# Patient Record
Sex: MALE | Race: WHITE | NOT HISPANIC OR LATINO | Employment: FULL TIME | URBAN - METROPOLITAN AREA
[De-identification: names, ages, dates, MRNs, and addresses within clinical notes are randomized per-mention and may not be internally consistent; named-entity substitution may affect disease eponyms.]

---

## 2017-01-04 ENCOUNTER — GENERIC CONVERSION - ENCOUNTER (OUTPATIENT)
Dept: OTHER | Facility: OTHER | Age: 32
End: 2017-01-04

## 2017-01-13 ENCOUNTER — GENERIC CONVERSION - ENCOUNTER (OUTPATIENT)
Dept: OTHER | Facility: OTHER | Age: 32
End: 2017-01-13

## 2017-01-27 ENCOUNTER — GENERIC CONVERSION - ENCOUNTER (OUTPATIENT)
Dept: OTHER | Facility: OTHER | Age: 32
End: 2017-01-27

## 2017-02-10 ENCOUNTER — GENERIC CONVERSION - ENCOUNTER (OUTPATIENT)
Dept: OTHER | Facility: OTHER | Age: 32
End: 2017-02-10

## 2017-03-28 ENCOUNTER — ALLSCRIPTS OFFICE VISIT (OUTPATIENT)
Dept: OTHER | Facility: OTHER | Age: 32
End: 2017-03-28

## 2017-03-29 ENCOUNTER — ALLSCRIPTS OFFICE VISIT (OUTPATIENT)
Dept: OTHER | Facility: OTHER | Age: 32
End: 2017-03-29

## 2017-04-05 ENCOUNTER — ALLSCRIPTS OFFICE VISIT (OUTPATIENT)
Dept: OTHER | Facility: OTHER | Age: 32
End: 2017-04-05

## 2018-01-11 NOTE — MISCELLANEOUS
Message  Return to work or school:   Babak Fairchild is under my professional care   He was seen in my office on 02/10/2016   He is able to return to work on  02/17/2016            Signatures   Electronically signed by : Huma Montoya, ; Feb 17 2016 12:43PM EST                       (Author)

## 2018-01-13 VITALS
WEIGHT: 214 LBS | DIASTOLIC BLOOD PRESSURE: 80 MMHG | BODY MASS INDEX: 28.99 KG/M2 | HEIGHT: 72 IN | SYSTOLIC BLOOD PRESSURE: 128 MMHG | TEMPERATURE: 98.2 F

## 2018-01-13 NOTE — MISCELLANEOUS
Message  Return to work or school:   Dneisha Alexander is under my professional care   He was seen in my office on 3/29/17   He is able to return to work on  3/30/17            Signatures   Electronically signed by : Gloria Britt MD; Mar 29 2017 11:42AM EST                       (Author)

## 2018-01-14 VITALS
DIASTOLIC BLOOD PRESSURE: 80 MMHG | HEART RATE: 96 BPM | SYSTOLIC BLOOD PRESSURE: 130 MMHG | WEIGHT: 215 LBS | BODY MASS INDEX: 29.12 KG/M2 | HEIGHT: 72 IN | RESPIRATION RATE: 16 BRPM | TEMPERATURE: 98.3 F

## 2018-01-16 NOTE — MISCELLANEOUS
Provider Comments  Provider Comments:   No Show  Established patient who had I&d of abscess in the office on 3/29/17  Patient was a NO SHOW today for his 1 week recheck of open wound        Signatures   Electronically signed by : Des Saleh, ; Apr 5 2017 11:26AM EST                       (Author)

## 2018-01-24 NOTE — CONSULTS
Assessment    1  Abdominal abscess (567 22) (K65 1)    Plan  PMH: History of abdominal abscess    · Sulfamethoxazole-Trimethoprim 800-160 MG Oral Tablet   Rx By: Desi Paz; Dispense: 0 Days ; #:14 Tablet; Refill: 0; For: PMH: History of abdominal abscess; CHRISTINA = N; Sent To: [id number]; Last Updated By: Lorie Peralta; 3/29/2017 11:59:41 AM    Discussion/Summary  Discussion Summary:   Yao Mckay is a 33year old male who presents today, per referral by Dr Veronica Araujo, for possible incision and drainage of an infected cyst  Physical exam showed a roughly 2 cm fluctuant structure under skin minimal cellulitis  Discussed risks, benefits, and alternatives of incision and drainage along with pre- and post- procedural protocol  Partial excision was performed but not tolerated well by the patient  Patient refused full formal excision at this time despite being told that there was a significant chance that the cyst would recur without full excision of the cyst wall  His wound was packed using 1/4 inch gauze today  Wound packing instructions were given with his step-father present  If he cannot tolerate wound packing, he was instructed to shower and begin applying silver gel with a gauze bandage daily  No cellulitis or active infection was observed thus he was recommended not to take his antibiotics to forgo unnecessary GI side effects  Explained that he can return to work as long as he feels comfortable  He was given a note excuse him from work today  He knows to contact our office if any problems or concerns arise  He will return for recheck next week  Enumerated the health benefits of smoking cessation  Patient expressed that he is not interested in quitting at this time  Chief Complaint  Chief Complaint Free Text Note Form: Skin "abscess"  New patient referred by his PCP, Veronica Araujo whom he saw yesterday  Was placed on Bactrim and scheduled with general surgery   Patient comes in today with raised, hard area to right pubic bone line  Very mild pink-tinged skin  No redness or active infection  No open wound or drainage  No fever or chills  Patient states he has had a lump to area for about 6 weeks  Just became painful yesterday  Patient extremely anxious about having skin opened and drained  Did not tolerate well  Small opening made in skin  Small amount of clear fluid and blood drained out  Patient refused to have cyst wall completely excised  Explained that "infection" may return  Packed with 1/4' plain packing  Patient instructed to also  some Silvergel ointment  To followup in one week  History of Present Illness  HPI: Marcus Cosby is a 33year old male who presents today, per referral by Dr Joann Morgan, for possible incision and drainage of an infected cyst  First noticed a lump at his waistline on right abdomen about a month and a half ago  He denies having pain until this past weekend when it became inflamed and painful  He states that he has never had a skin lesion in the past  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  He has not yet started taking antibiotics prescribed by his PCP  Patient is a current every day smoker  Review of Systems  Complete-Male:   Constitutional: No fever or chills, feels well, no tiredness, no recent weight gain or weight loss  Eyes: No complaints of eye pain, no red eyes, no discharge from eyes, no itchy eyes  ENT: no complaints of earache, no hearing loss, no nosebleeds, no nasal discharge, no sore throat, no hoarseness  Cardiovascular: No complaints of slow heart rate, no fast heart rate, no chest pain, no palpitations, no leg claudication, no lower extremity  Respiratory: No complaints of shortness of breath, no wheezing, no cough, no SOB on exertion, no orthopnea or PND     Gastrointestinal: No complaints of abdominal pain, no constipation, no nausea or vomiting, no diarrhea or bloody stools  Genitourinary: No complaints of dysuria, no incontinence, no hesitancy, no nocturia, no genital lesion, no testicular pain  Musculoskeletal: No complaints of arthralgia, no myalgias, no joint swelling or stiffness, no limb pain or swelling  Integumentary: skin lesion, but as noted in HPI  Neurological: No compliants of headache, no confusion, no convulsions, no numbness or tingling, no dizziness or fainting, no limb weakness, no difficulty walking  Psychiatric: Is not suicidal, no sleep disturbances, no anxiety or depression, no change in personality, no emotional problems  Endocrine: No complaints of proptosis, no hot flashes, no muscle weakness, no erectile dysfunction, no deepening of the voice, no feelings of weakness  Hematologic/Lymphatic: No complaints of swollen glands, no swollen glands in the neck, does not bleed easily, no easy bruising  ROS Reviewed:   ROS reviewed  Active Problems    1  Depression (311) (F32 9)   2  Low back pain (724 2) (M54 5)   3  Lumbar strain (847 2) (S39 012A)    Past Medical History    1  History of abdominal abscess (V13 89) (H87 247)  Active Problems And Past Medical History Reviewed: The active problems and past medical history were reviewed and updated today  Surgical History    1  History of Tonsillectomy  Surgical History Reviewed: The surgical history was reviewed and updated today  Family History  Mother    1  No pertinent family history  Family History Reviewed: The family history was reviewed and updated today  Social History    · Current every day smoker (305 1) (F17 200)   · Daily caffeine consumption, 1 serving a day   · Full-time employment   · Occasional alcohol use   · Tobacco use (305 1) (Z72 0)  Social History Reviewed: The social history was reviewed and updated today  The social history was reviewed and is unchanged  Current Meds   1   Sulfamethoxazole-Trimethoprim 800-160 MG Oral Tablet; TAKE 1 TABLET TWICE DAILY   FOR 7 DAYS; Therapy: [id number] to (Last Rx:28Mar2017)  Requested for: 28Mar2017 Ordered  Medication List Reviewed: The medication list was reviewed and updated today  Allergies    1  Penicillins    Vitals  Vital Signs    Recorded: 84TWY1049 11:03AM   Temperature 98 3 F   Heart Rate 96   Respiration 16   Systolic 919   Diastolic 80   Height 6 ft    Weight 215 lb    BMI Calculated 29 16   BSA Calculated 2 2     Physical Exam    Constitutional   General appearance: No acute distress, well appearing and well nourished  Eyes   Conjunctiva and lids: No swelling, erythema, or discharge  Pupils and irises: Equal, round and reactive to light  Sclera non-icteric  Ears, Nose, Mouth, and Throat   External inspection of ears and nose: Normal     Neck   Supple, symmetric, trachea midline, no masses   Pulmonary   Respiratory effort: No increased work of breathing or signs of respiratory distress  Auscultation of lungs: Clear to auscultation, equal breath sounds bilaterally, no wheezes, no rales, no rhonci  Cardiovascular   Auscultation of heart: Normal rate and rhythm, normal S1 and S2, without murmurs  Examination of extremities for edema and/or varicosities: Normal     Carotid pulses: Normal     Abdomen   Abdomen: Non-tender, no masses  Liver and spleen: No hepatomegaly or splenomegaly  Lymphatic   Palpation of lymph nodes in neck: No lymphadenopathy  Musculoskeletal   Digits and nails: Normal without clubbing or cyanosis  Extremities: No clubbing, no cyanosis, no edema   Skin   Skin and subcutaneous tissue: Normal without rashes or lesions  Examination of the skin for lesions: Abnormal   Purplish area right of midline at waist line  Roughly 2 cm fluctuant structure under skin minimal cellulitis  Neurologic   Sensation: Motor and sensory grossly intact      Psychiatric   Orientation to person, place and time: Normal     Mood and affect: Normal  Procedure    Procedure: incision and drainage of a(n) a cyst located on the (At waist-line right of midline) right abdomen  Risks, benefits, alternatives, infection risk, bleeding risk, allergic reaction risk and risk of excessive pain were discussed with the patient  Verbal consent was obtained prior to the procedure  Anesthesia: The area was anesthetized with of lidocaine 1% with epinephrine  The area was anesthetized with bupivacaine 0 25% without epinephrine  Procedure Note:   The area was incised with a #15 blade  A small brown and serosanguineous  (Murky) amount of fluid was drained  The specimen was place in buffered formalin and sent for pathology  Dressing: the cavity was loosely packed with a 1/4 inch wick and a sterile dressing was placed  Post-Procedure:   After the procedure, the patient complained of: excessive pain  Complications: there were no complications  Wound care instructions given to the patient include washing with soap and water, gently pat dry and applying silver gel  Wound care instructions were given to the patient  Follow-up in the office in 1 week(s)  A mixture of 1% Lidocaine with epinephrine and 0 25% bupivacaine without epinephrine was injected to anesthetize the purplish area at his waist line and right of his midline  An incision was made using a 15 blade scalpel  Partial removal of the cyst wall was performed however full excision was halted at the patient's request  Some murky fluid was expressed during incision and drainage  A gauze dressing was applied  The procedure was not tolerated but occurred without complication  Attending Note  Scribe Attestation:   Scribe Attestation Bruce BLAS am acting as a scribe in the presence of the attending physician while the attending physician examines the patient     Physician Attestation:   Jordana Deleon personally performed the services described in this documentation as scribed in my presence, and it is both accurate and complete        Future Appointments    Date/Time Provider Specialty Site   04/05/2017 11:00 AM Pat Reyna MD General Surgery [facility]     Signatures   Electronically signed by : Buck Kramer MD; Mar 31 2017  8:49AM EST                       (Author)

## 2020-08-13 ENCOUNTER — APPOINTMENT (EMERGENCY)
Dept: CT IMAGING | Facility: HOSPITAL | Age: 35
End: 2020-08-13

## 2020-08-13 ENCOUNTER — HOSPITAL ENCOUNTER (EMERGENCY)
Facility: HOSPITAL | Age: 35
Discharge: HOME | End: 2020-08-13
Attending: EMERGENCY MEDICINE

## 2020-08-13 VITALS
TEMPERATURE: 98 F | HEART RATE: 75 BPM | DIASTOLIC BLOOD PRESSURE: 66 MMHG | RESPIRATION RATE: 18 BRPM | SYSTOLIC BLOOD PRESSURE: 137 MMHG | OXYGEN SATURATION: 98 % | WEIGHT: 216.49 LBS | BODY MASS INDEX: 29.36 KG/M2

## 2020-08-13 DIAGNOSIS — R10.9 ABDOMINAL PAIN: Primary | ICD-10-CM

## 2020-08-13 DIAGNOSIS — N20.0 KIDNEY STONE: ICD-10-CM

## 2020-08-13 LAB
BACTERIA UR QL AUTO: ABNORMAL /HPF
BILIRUB UR QL STRIP: NEGATIVE
CLARITY UR: CLEAR
COLOR UR: YELLOW
COLOR, POC: YELLOW
GLUCOSE UR STRIP-MCNC: NEGATIVE MG/DL
HGB UR QL STRIP.AUTO: ABNORMAL
KETONES UR STRIP-MCNC: NEGATIVE MG/DL
LEUKOCYTE ESTERASE UR QL STRIP: ABNORMAL
MUCOUS THREADS UR QL AUTO: ABNORMAL
NITRITE UR QL STRIP: NEGATIVE
NON-SQ EPI CELLS URNS QL MICRO: ABNORMAL /HPF
PH UR STRIP.AUTO: 6 [PH] (ref 4.5–8)
PROT UR STRIP-MCNC: ABNORMAL MG/DL
RBC #/AREA URNS AUTO: ABNORMAL /HPF
SP GR UR STRIP.AUTO: >=1.03 (ref 1–1.03)
UROBILINOGEN UR QL STRIP.AUTO: 0.2 E.U./DL
WBC #/AREA URNS AUTO: ABNORMAL /HPF

## 2020-08-13 PROCEDURE — 87086 URINE CULTURE/COLONY COUNT: CPT

## 2020-08-13 PROCEDURE — G1004 CDSM NDSC: HCPCS

## 2020-08-13 PROCEDURE — 74176 CT ABD & PELVIS W/O CONTRAST: CPT

## 2020-08-13 PROCEDURE — 99282 EMERGENCY DEPT VISIT SF MDM: CPT | Performed by: EMERGENCY MEDICINE

## 2020-08-13 PROCEDURE — 81001 URINALYSIS AUTO W/SCOPE: CPT

## 2020-08-13 PROCEDURE — 99284 EMERGENCY DEPT VISIT MOD MDM: CPT

## 2020-08-13 NOTE — ED PROVIDER NOTES
History  Chief Complaint   Patient presents with    Abdominal Pain     Pt reports intense pain earlier today with episodes of vomiting  Pt states most of the pain has subsided but has sharp pain in LLQ  History provided by:  Patient   used: No    Abdominal Pain   Pain location:  L flank and LLQ  Pain quality: cramping    Pain radiates to:  Does not radiate  Pain severity:  Moderate  Onset quality:  Gradual  Duration:  6 hours  Timing:  Sporadic  Progression:  Improving  Chronicity:  New  Context: suspicious food intake    Context comment:  Sudden severe left flank pain in morning, now getting better  Relieved by:  Nothing  Worsened by:  Nothing  Ineffective treatments:  None tried  Associated symptoms: vomiting    Associated symptoms: no chest pain, no chills, no cough, no diarrhea, no dysuria, no fever, no hematochezia, no nausea, no shortness of breath and no sore throat    Vomiting:     Quality:  Stomach contents    Number of occurrences:  3    Severity:  Moderate    Duration:  1 hour    Timing:  Sporadic    Progression:  Resolved  Risk factors: has not had multiple surgeries        None       Past Medical History:   Diagnosis Date    No known health problems        Past Surgical History:   Procedure Laterality Date    TONSILLECTOMY         History reviewed  No pertinent family history  I have reviewed and agree with the history as documented  E-Cigarette/Vaping    E-Cigarette Use Never User      E-Cigarette/Vaping Substances     Social History     Tobacco Use    Smoking status: Current Every Day Smoker     Packs/day: 0 50    Smokeless tobacco: Never Used   Substance Use Topics    Alcohol use: Yes     Comment: social    Drug use: Yes     Types: Marijuana       Review of Systems   Constitutional: Negative for chills and fever  HENT: Negative for facial swelling, sore throat and trouble swallowing  Eyes: Negative for pain and visual disturbance     Respiratory: Negative for cough and shortness of breath  Cardiovascular: Negative for chest pain and leg swelling  Gastrointestinal: Positive for abdominal pain and vomiting  Negative for diarrhea, hematochezia and nausea  Genitourinary: Negative for dysuria and flank pain  Musculoskeletal: Negative for back pain, neck pain and neck stiffness  Skin: Negative for pallor and rash  Allergic/Immunologic: Negative for environmental allergies and immunocompromised state  Neurological: Negative for dizziness and headaches  Hematological: Negative for adenopathy  Does not bruise/bleed easily  Psychiatric/Behavioral: Negative for agitation and behavioral problems  All other systems reviewed and are negative  Physical Exam  Physical Exam  Vitals signs and nursing note reviewed  Constitutional:       General: He is not in acute distress  Appearance: He is well-developed  HENT:      Head: Normocephalic and atraumatic  Neck:      Musculoskeletal: Normal range of motion and neck supple  Cardiovascular:      Rate and Rhythm: Normal rate and regular rhythm  Heart sounds: Normal heart sounds  Pulmonary:      Effort: Pulmonary effort is normal       Breath sounds: Normal breath sounds  Abdominal:      General: Bowel sounds are normal       Palpations: Abdomen is soft  Tenderness: There is no abdominal tenderness  There is no guarding or rebound  Musculoskeletal: Normal range of motion  Skin:     General: Skin is warm and dry  Neurological:      Mental Status: He is alert and oriented to person, place, and time           Vital Signs  ED Triage Vitals [08/13/20 1423]   Temperature Pulse Respirations Blood Pressure SpO2   98 °F (36 7 °C) 75 18 137/66 98 %      Temp Source Heart Rate Source Patient Position - Orthostatic VS BP Location FiO2 (%)   Temporal Monitor Sitting Right arm --      Pain Score       5           Vitals:    08/13/20 1423   BP: 137/66   Pulse: 75   Patient Position - Orthostatic VS: Sitting         Visual Acuity      ED Medications  Medications - No data to display    Diagnostic Studies  Results Reviewed     Procedure Component Value Units Date/Time    Urine Microscopic [[id number]]  (Abnormal) Collected:  08/13/20 1536    Lab Status:  Final result Specimen:  Urine, Clean Catch Updated:  08/13/20 1558     RBC, UA 10-20 /hpf      WBC, UA Innumerable /hpf      Epithelial Cells Occasional /hpf      Bacteria, UA Innumerable /hpf      MUCUS THREADS Moderate    Urine culture [[id number]] Collected:  08/13/20 1536    Lab Status: In process Specimen:  Urine, Clean Catch Updated:  08/13/20 1557    POCT urinalysis dipstick [[id number]]  (Abnormal) Resulted:  08/13/20 1540    Lab Status:  Final result Specimen:  Urine Updated:  08/13/20 1540     Color, UA yellow    Urine Macroscopic, POC [[id number]]  (Abnormal) Collected:  08/13/20 1536    Lab Status:  Final result Specimen:  Urine Updated:  08/13/20 1538     Color, UA Yellow     Clarity, UA Clear     pH, UA 6 0     Leukocytes, UA Trace     Nitrite, UA Negative     Protein, UA Trace mg/dl      Glucose, UA Negative mg/dl      Ketones, UA Negative mg/dl      Urobilinogen, UA 0 2 E U /dl      Bilirubin, UA Negative     Blood, UA Trace     Specific Gravity, UA >=1 030    Narrative:       [facility] RESULT                 CT renal stone study abdomen pelvis without contrast   Final Result by Evette Manriquez MD (08/13 1508)      2 mm nonobstructing left-sided intrarenal calculus  No hydronephrosis  Workstation performed: [id number]                    Procedures  Procedures         ED Course  ED Course as of Aug 13 1639   Thu Aug 13, 2020   4539 Patient does not want IV, understands that if we do not put IV/check labs/get CT with contrast, the ER evaluation would not be optimal and potential significant pathology may be missed   Patient and significant other also discussed, patient is ok with CT w/o contrast, understands that if his symptoms get worse, he may need to come back and get further evaluation including repeat CT with contrast, informed and understands risk of radiation  1552 Leukocytes, UA(!): Trace   1552 POCT URINE PROTEIN(!): Trace   1552 Urine dip noted, Micro pending, patient wants to  leave  Blood, UA(!): Trace                                             [facility]  Number of Diagnoses or Management Options  Abdominal pain: new and requires workup  Kidney stone:   Diagnosis management comments: Impression: Left-sided mid anterior abdominal pain, started in morning, now better, no distress, VSS, Abd exam benign  Patient just wants to be evaluated  Adamantly declines IV, lab testing, ok with CT without contrast understanding that ER evaluation would be suboptimal and potential serious pathology may be missed  CT showed small intrarenal calculus, however possibility of a passed stone there as patient now feels better with acute pain resolved  Advised to ensure adequate oral hydration, increased water intake, follow-up with urology  Return to ED for worsening symptoms  Amount and/or Complexity of Data Reviewed  Clinical lab tests: reviewed and ordered  Tests in the radiology section of CPT®: ordered and reviewed  Independent visualization of images, tracings, or specimens: yes          Disposition  Final diagnoses:   Abdominal pain   Kidney stone     Time reflects when diagnosis was documented in both MDM as applicable and the Disposition within this note     Time User Action Codes Description Comment    8/13/2020  3:56 PM Jarome Cart Add [R10 9] Abdominal pain     8/13/2020  3:56 PM Jarome Cart Add [N20 0] Kidney stone       ED Disposition     ED Disposition Condition Date/Time Comment    Discharge Stable Thu Aug 13, 2020  3:56 PM Rosmery Mobley discharge to home/self care              Follow-up Information     Follow up With Specialties Details Why Contact Info Additional Information    Channing Saul DO Family Medicine Schedule an appointment as soon as possible for a visit   [id number]       [address] Emergency Department Emergency Medicine  If symptoms worsen [facility] [address] [phone] [place] ED, [address]  , [place], [place], [address] For Urology [facility] Urology Schedule an appointment as soon as possible for a visit   [address]  [address]  [address] [place] [address] For Urology [address], [place], [address] [phone]          There are no discharge medications for this patient  No discharge procedures on file      PDMP Review     None          ED Provider  Electronically Signed by           Cornelius Horn MD  08/13/20 3710

## 2020-08-13 NOTE — ED NOTES
Patient transported to [facility] [address], RN  08/13/20 6975
none

## 2020-08-15 LAB — BACTERIA UR CULT: NORMAL

## 2021-06-04 ENCOUNTER — HOSPITAL ENCOUNTER (EMERGENCY)
Facility: HOSPITAL | Age: 36
Discharge: HOME | End: 2021-06-04
Attending: EMERGENCY MEDICINE | Admitting: EMERGENCY MEDICINE

## 2021-06-04 VITALS
SYSTOLIC BLOOD PRESSURE: 138 MMHG | BODY MASS INDEX: 30.77 KG/M2 | DIASTOLIC BLOOD PRESSURE: 98 MMHG | TEMPERATURE: 98.5 F | HEART RATE: 90 BPM | OXYGEN SATURATION: 98 % | WEIGHT: 226.85 LBS | RESPIRATION RATE: 16 BRPM

## 2021-06-04 DIAGNOSIS — T78.40XA ACUTE ALLERGIC REACTION: Primary | ICD-10-CM

## 2021-06-04 DIAGNOSIS — L50.9 URTICARIA: ICD-10-CM

## 2021-06-04 DIAGNOSIS — M79.89 SWELLING OF BOTH HANDS: ICD-10-CM

## 2021-06-04 DIAGNOSIS — T78.3XXA ANGIOEDEMA: ICD-10-CM

## 2021-06-04 PROCEDURE — 99284 EMERGENCY DEPT VISIT MOD MDM: CPT | Performed by: EMERGENCY MEDICINE

## 2021-06-04 PROCEDURE — 99283 EMERGENCY DEPT VISIT LOW MDM: CPT

## 2021-06-04 RX ORDER — EPINEPHRINE 0.3 MG/.3ML
0.3 INJECTION SUBCUTANEOUS ONCE
Qty: 0.6 ML | Refills: 0 | Status: SHIPPED | OUTPATIENT
Start: 2021-06-04 | End: 2021-06-04

## 2021-06-04 RX ORDER — DIPHENHYDRAMINE HCL 25 MG
50 TABLET ORAL EVERY 8 HOURS PRN
Qty: 20 TABLET | Refills: 0 | Status: SHIPPED | OUTPATIENT
Start: 2021-06-04

## 2021-06-04 RX ORDER — DIPHENHYDRAMINE HCL 25 MG
50 TABLET ORAL ONCE
Status: COMPLETED | OUTPATIENT
Start: 2021-06-04 | End: 2021-06-04

## 2021-06-04 RX ORDER — PREDNISONE 20 MG/1
60 TABLET ORAL ONCE
Status: COMPLETED | OUTPATIENT
Start: 2021-06-04 | End: 2021-06-04

## 2021-06-04 RX ORDER — PREDNISONE 20 MG/1
40 TABLET ORAL DAILY
Qty: 10 TABLET | Refills: 0 | Status: SHIPPED | OUTPATIENT
Start: 2021-06-04 | End: 2021-06-09

## 2021-06-04 RX ORDER — DIAPER,BRIEF,INFANT-TODD,DISP
EACH MISCELLANEOUS ONCE
Status: COMPLETED | OUTPATIENT
Start: 2021-06-04 | End: 2021-06-04

## 2021-06-04 RX ORDER — DIAPER,BRIEF,INFANT-TODD,DISP
EACH MISCELLANEOUS
Qty: 15 G | Refills: 0 | Status: SHIPPED | OUTPATIENT
Start: 2021-06-04

## 2021-06-04 RX ADMIN — DIPHENHYDRAMINE HCL 50 MG: 25 TABLET ORAL at 05:32

## 2021-06-04 RX ADMIN — HYDROCORTISONE 1 APPLICATION: 1 CREAM TOPICAL at 06:02

## 2021-06-04 RX ADMIN — PREDNISONE 60 MG: 20 TABLET ORAL at 05:32

## 2021-06-04 NOTE — ED PROVIDER NOTES
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 to new material  Took benadryl yesterday with some relief  History provided by:  Patient   used: No    Allergic Reaction  Presenting symptoms: itching, rash (hives b/l UE) and swelling    Presenting symptoms: no wheezing    Itching:     Location:  Hand and arm    Severity:  Severe    Onset quality:  Gradual    Duration:  4 days    Timing:  Constant    Progression:  Worsening  Rash:     Location:  Arm    Severity:  Moderate    Onset quality:  Gradual    Duration:  4 days    Timing:  Constant  Severity:  Moderate  Duration:  4 days  Prior allergic episodes:  No prior episodes  Context comment:  New fabric at work  Relieved by: Antihistamines  Worsened by:  Nothing      None       Past Medical History:   Diagnosis Date    No known health problems        Past Surgical History:   Procedure Laterality Date    TONSILLECTOMY         History reviewed  No pertinent family history  I have reviewed and agree with the history as documented      E-Cigarette/Vaping    E-Cigarette Use Never User      E-Cigarette/Vaping Substances     Social History     Tobacco Use    Smoking status: Current Every Day Smoker     Packs/day: 0 50    Smokeless tobacco: Never Used   Substance Use Topics    Alcohol use: Yes     Comment: social    Drug use: Yes     Types: Marijuana       Review of Systems   Constitutional: Negative for chills and fever    HENT: Negative for congestion and sore throat  Eyes: Negative for visual disturbance  Respiratory: Negative for shortness of breath and wheezing  Cardiovascular: Negative for chest pain and palpitations  Gastrointestinal: Negative for abdominal pain, diarrhea, nausea and vomiting  Genitourinary: Negative for dysuria  Musculoskeletal: Negative for neck pain and neck stiffness  Skin: Positive for itching and rash (hives b/l UE)  Negative for pallor  Swollen hands b/l   Neurological: Negative for headaches  Psychiatric/Behavioral: Negative for confusion  All other systems reviewed and are negative  Physical Exam  Physical Exam  Vitals signs and nursing note reviewed  Constitutional:       General: He is not in acute distress  Appearance: He is well-developed  HENT:      Head: Normocephalic and atraumatic  Right Ear: External ear normal       Left Ear: External ear normal       Mouth/Throat:      Mouth: Mucous membranes are moist       Comments: No angioedema noted  Eyes:      Extraocular Movements: Extraocular movements intact  Neck:      Musculoskeletal: Neck supple  Cardiovascular:      Rate and Rhythm: Normal rate and regular rhythm  Heart sounds: No murmur  Pulmonary:      Effort: Pulmonary effort is normal  No respiratory distress  Breath sounds: Normal breath sounds  No wheezing  Musculoskeletal: Normal range of motion  General: Swelling (b/l hands, palmar erythema from excoriation of pt itching) present  Skin:     General: Skin is warm  Coloration: Skin is not pale  Findings: Rash (hives b/l UE) present  Neurological:      Mental Status: He is alert and oriented to person, place, and time     Psychiatric:         Behavior: Behavior normal          Vital Signs  ED Triage Vitals [06/04/21 0518]   Temperature Pulse Respirations Blood Pressure SpO2   98 5 °F (36 9 °C) 102 18 (!) 141/101 98 %      Temp Source Heart Rate Source Patient Position - Orthostatic VS BP Location FiO2 (%)   Oral Monitor Sitting Left arm --      Pain Score       --           Vitals:    06/04/21 0518   BP: (!) 141/101   Pulse: 102   Patient Position - Orthostatic VS: Sitting         Visual Acuity      ED Medications  Medications   hydrocortisone 1 % cream (has no administration in time range)   diphenhydrAMINE (BENADRYL) tablet 50 mg (50 mg Oral Given 6/4/21 0532)   predniSONE tablet 60 mg (60 mg Oral Given 6/4/21 0532)       Diagnostic Studies  Results Reviewed     None                 No orders to display              Procedures  Procedures         ED Course  ED Course as of Jun 04 0552 Fri Jun 04, 2021   0515 Pt seen and examined  30 yo male who presents with generalized itchy body rash and swelling in hands b/l  No facial/oral angioedema or airway involvement  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 to new material  Took benadryl yesterday with some relief  Will treat with benadryl and prednisone here and d/c home on same with epi pen  Pt aware of need to avoid same material he had reaction to  MDM    Disposition  Final diagnoses:   Acute allergic reaction   Urticaria   Swelling of both hands   Angioedema     Time reflects when diagnosis was documented in both MDM as applicable and the Disposition within this note     Time User Action Codes Description Comment    6/4/2021  5:47 AM Evette BROWN Add [T78 40XA] Acute allergic reaction     6/4/2021  5:47 AM Evette BROWN Add [L50 9] Urticaria     6/4/2021  5:47 AM Negra Jones Add [M79 89] Swelling of both hands     6/4/2021  5:51 AM Patricia [address]  74  3XXA] Angioedema       ED Disposition     ED Disposition Condition Date/Time Comment    Discharge Stable Fri Jun 4, 2021  5:47 AM Yvette Fair discharge to home/self care              Follow-up Information     Follow up With Specialties Details Why Contact Info    Whit Suarez DO Family Medicine Schedule an appointment as soon as possible for a visit  As needed Arun Reeves  [phone]            Patient's Medications   Discharge Prescriptions    DIPHENHYDRAMINE (BENADRYL) 25 MG TABLET    Take 2 tablets (50 mg total) by mouth every 8 (eight) hours as needed for itching       Start Date: 6/4/2021  End Date: --       Order Dose: 50 mg       Quantity: 20 tablet    Refills: 0    EPINEPHRINE (EPIPEN) 0 3 MG/0 3 ML SOAJ    Inject 0 3 mL (0 3 mg total) into a muscle once for 1 dose       Start Date: 6/4/2021  End Date: 6/4/2021       Order Dose: 0 3 mg       Quantity: 0 6 mL    Refills: 0    HYDROCORTISONE 1 % CREAM    Apply to affected area 3 times daily as needed       Start Date: 6/4/2021  End Date: --       Order Dose: --       Quantity: 15 g    Refills: 0    PREDNISONE 20 MG TABLET    Take 2 tablets (40 mg total) by mouth daily for 5 days       Start Date: 6/4/2021  End Date: 6/9/2021       Order Dose: 40 mg       Quantity: 10 tablet    Refills: 0     No discharge procedures on file      PDMP Review     None          ED Provider  Electronically Signed by           Sylvia Gómez DO  06/04/21 0459

## 2021-06-04 NOTE — Clinical Note
Jose Vergara was seen and treated in our emergency department on 6/4/2021  Please allow pt to avoid direct contact with fabric he had allergic reaction to    Diagnosis: Allergic reaction    Romulo Glass  may return to work on return date  He may return on this date: 06/07/2021         If you have any questions or concerns, please don't hesitate to call        Lida Izquierdo, DO    ______________________________           _______________          _______________  Hospital Representative                              Date                                Time

## 2021-06-06 ENCOUNTER — HOSPITAL ENCOUNTER (EMERGENCY)
Facility: HOSPITAL | Age: 36
Discharge: HOME | End: 2021-06-06
Attending: EMERGENCY MEDICINE | Admitting: EMERGENCY MEDICINE

## 2021-06-06 VITALS
TEMPERATURE: 98.7 F | BODY MASS INDEX: 29.84 KG/M2 | DIASTOLIC BLOOD PRESSURE: 98 MMHG | WEIGHT: 220 LBS | RESPIRATION RATE: 20 BRPM | OXYGEN SATURATION: 95 % | HEART RATE: 116 BPM | SYSTOLIC BLOOD PRESSURE: 149 MMHG

## 2021-06-06 DIAGNOSIS — T78.40XA ALLERGIC REACTION: Primary | ICD-10-CM

## 2021-06-06 PROCEDURE — 99283 EMERGENCY DEPT VISIT LOW MDM: CPT

## 2021-06-06 PROCEDURE — 99284 EMERGENCY DEPT VISIT MOD MDM: CPT | Performed by: EMERGENCY MEDICINE

## 2021-06-06 RX ORDER — HYDROXYZINE HYDROCHLORIDE 25 MG/1
25 TABLET, FILM COATED ORAL EVERY 6 HOURS PRN
Qty: 20 TABLET | Refills: 0 | Status: SHIPPED | OUTPATIENT
Start: 2021-06-06

## 2021-06-06 RX ORDER — EPINEPHRINE 0.3 MG/.3ML
0.3 INJECTION SUBCUTANEOUS ONCE
Qty: 0.6 ML | Refills: 0 | Status: SHIPPED | OUTPATIENT
Start: 2021-06-06 | End: 2021-06-06

## 2021-06-06 RX ORDER — PREDNISONE 20 MG/1
30 TABLET ORAL 2 TIMES DAILY
Qty: 12 TABLET | Refills: 0 | Status: SHIPPED | OUTPATIENT
Start: 2021-06-06 | End: 2021-06-10

## 2021-06-06 RX ORDER — FAMOTIDINE 20 MG/1
20 TABLET, FILM COATED ORAL 2 TIMES DAILY
Qty: 30 TABLET | Refills: 0 | Status: SHIPPED | OUTPATIENT
Start: 2021-06-06

## 2021-06-06 RX ORDER — HYDROXYZINE HYDROCHLORIDE 25 MG/1
25 TABLET, FILM COATED ORAL ONCE
Status: COMPLETED | OUTPATIENT
Start: 2021-06-06 | End: 2021-06-06

## 2021-06-06 RX ADMIN — PREDNISONE 30 MG: 10 TABLET ORAL at 14:24

## 2021-06-06 RX ADMIN — HYDROXYZINE HYDROCHLORIDE 25 MG: 25 TABLET ORAL at 14:24

## 2021-06-06 NOTE — ED ATTENDING ATTESTATION
6/6/2021  I, Sunita Solano MD, saw and evaluated the patient  I have discussed the patient with the resident/non-physician practitioner and agree with the resident's/non-physician practitioner's findings, Plan of Care, and MDM as documented in the resident's/non-physician practitioner's note, except where noted  All available labs and Radiology studies were reviewed  I was present for key portions of any procedure(s) performed by the resident/non-physician practitioner and I was immediately available to provide assistance  At this point I agree with the current assessment done in the Emergency Department  I have conducted an independent evaluation of this patient a history and physical is as follows:    Final Diagnosis:  1  Allergic reaction      Chief Complaint   Patient presents with    Rash     states expose to material at work on Friday that he was allergic to taking prednisone for it as once prednisone wears off pt with trouble breathing swelling and iching no reexpose to material since Friday       This is a 60-year-old male who presents with allergic reaction  Starting on Wednesday, patient started with facial swelling and itching  Believes that he is allergic to a material at his work  He was seen in the emergency department on Friday and started on Benadryl and prednisone  States that he takes the prednisone at night and feels much better  However, it only last for approximately 12 hours  He is also taking Benadryl intermittently which alleviates his symptoms  Denies any reexposure  Denies any difficulty swallowing, difficulty breathing, wheezing/stridor, tongue swelling/throat swelling, chest pain/shortness of breath, abdominal pain, nausea/vomiting, diarrhea    Denies fever/chills, nausea/vomiting, lightheadedness/dizziness, numbness/weakness, headache, change in vision, URI symptoms, neck pain, chest pain, palpitations, shortness of breath, cough, back pain, flank pain, abdominal pain, diarrhea, hematochezia, melena, dysuria, hematuria  PMH:  -seasonal allergies  PSH:  - not applicable      PE:   Vitals:    06/06/21 1350   BP: 149/98   BP Location: Right arm   Pulse: (!) 116   Resp: 20   Temp: 98 7 °F (37 1 °C)   TempSrc: Oral   SpO2: 95%   Weight: 99 8 kg (220 lb)       Constitutional: Vital signs are normal  He appears well-developed  He is cooperative  No distress  HENT:   Mouth/Throat: Uvula is midline, oropharynx is clear and moist and mucous membranes are normal   No or pharyngeal swelling  Eyes: Pupils are equal, round, and reactive to light  Mild periorbital swelling  Head:  Mild swelling throughout face with urticaria  Conjunctivae and EOM are normal    Neck: Trachea normal  No thyroid mass and no thyromegaly present  Cardiovascular:  Tachycardic, regular rhythm, normal heart sounds  Pulmonary/Chest: Effort normal and breath sounds normal    Abdominal: Soft  Normal appearance and bowel sounds are normal  There is no tenderness  There is no rebound, no guarding and no CVA tenderness  Neurological: He is alert  Skin: Skin is warm, dry and intact  Psychiatric: He has a normal mood and affect  His speech is normal and behavior is normal  Thought content normal          A:  - this is a 52-year-old male who presents with an allergic reaction  P:  - will change the prednisone to b i d  dosing  Continue Benadryl  Add Pepcid  Strict return precautions given  - 13 point ROS was performed and all are normal unless stated in the history above  - Nursing note reviewed  Vitals reviewed  - Orders placed by myself and/or advanced practitioner / resident     - Previous chart was reviewed  - No language barrier    - History obtained from patient  - There are no limitations to the history obtained  - Critical care time: Not applicable for this patient            Medications   hydrOXYzine HCL (ATARAX) tablet 25 mg (25 mg Oral Given 6/6/21 1424)   predniSONE tablet 30 mg (30 mg Oral Given 6/6/21 5854)     No orders to display     No orders of the defined types were placed in this encounter  Labs Reviewed - No data to display  Time reflects when diagnosis was documented in both MDM as applicable and the Disposition within this note     Time User Action Codes Description Comment    6/6/2021  2:21 PM [facility] [facility] [address] Allergic reaction       ED Disposition     ED Disposition Condition Date/Time Comment    Discharge Stable Sun Jun 6, 2021  2:21 PM Veronica Push discharge to home/self care  Follow-up Information    None       Patient's Medications   Discharge Prescriptions    EPINEPHRINE (EPIPEN) 0 3 MG/0 3 ML SOAJ    Inject 0 3 mL (0 3 mg total) into a muscle once for 1 dose       Start Date: 6/6/2021  End Date: 6/6/2021       Order Dose: 0 3 mg       Quantity: 0 6 mL    Refills: 0    FAMOTIDINE (PEPCID) 20 MG TABLET    Take 1 tablet (20 mg total) by mouth 2 (two) times a day       Start Date: 6/6/2021  End Date: --       Order Dose: 20 mg       Quantity: 30 tablet    Refills: 0    HYDROXYZINE HCL (ATARAX) 25 MG TABLET    Take 1 tablet (25 mg total) by mouth every 6 (six) hours as needed for itching for up to 20 doses       Start Date: 6/6/2021  End Date: --       Order Dose: 25 mg       Quantity: 20 tablet    Refills: 0    PREDNISONE 20 MG TABLET    Take 1 5 tablets (30 mg total) by mouth 2 (two) times a day for 4 days       Start Date: 6/6/2021  End Date: 6/10/2021       Order Dose: 30 mg       Quantity: 12 tablet    Refills: 0     No discharge procedures on file  Prior to Admission Medications   Prescriptions Last Dose Informant Patient Reported? Taking?    EPINEPHrine (EPIPEN) 0 3 mg/0 3 mL SOAJ   No No   Sig: Inject 0 3 mL (0 3 mg total) into a muscle once for 1 dose   diphenhydrAMINE (BENADRYL) 25 mg tablet   No Yes   Sig: Take 2 tablets (50 mg total) by mouth every 8 (eight) hours as needed for itching   hydrocortisone 1 % cream   No No   Sig: Apply to affected area 3 times daily as needed   predniSONE 20 mg tablet   No Yes   Sig: Take 2 tablets (40 mg total) by mouth daily for 5 days      Facility-Administered Medications: None       Portions of the record may have been created with voice recognition software  Occasional wrong word or "sound a like" substitutions may have occurred due to the inherent limitations of voice recognition software  Read the chart carefully and recognize, using context, where substitutions have occurred        ED Course         Critical Care Time  Procedures

## 2021-06-06 NOTE — ED PROVIDER NOTES
History  Chief Complaint   Patient presents with    Rash     states expose to material at work on Friday that he was allergic to taking prednisone for it as once prednisone wears off pt with trouble breathing swelling and iching no reexpose to material since Friday     29year-old male presents for evaluation of allergic reaction  Patient was seen in this emergency department on 06/04/2021 for the same, he was provided prescriptions for steroids, Benadryl, EpiPen, hydrocortisone cream   Patient believes that the steroid works to control his symptoms however believes that it wears off FPC through the day  Patient has been taking Benadryl intermittently because it makes him loopy  Patient has a itchy rash that is diffuse, he does have swelling to his periorbital area  Patient denies dyspnea or difficulty breathing, he has been eating and drinking his usual self without nausea, vomiting or diarrhea  Patient has history of allergy to bee stings  Patient states he has cleaned his clothes and things he brought with him to work on his initial exposure, he has not been to work since  Prior to Admission Medications   Prescriptions Last Dose Informant Patient Reported? Taking? EPINEPHrine (EPIPEN) 0 3 mg/0 3 mL SOAJ   No No   Sig: Inject 0 3 mL (0 3 mg total) into a muscle once for 1 dose   diphenhydrAMINE (BENADRYL) 25 mg tablet   No Yes   Sig: Take 2 tablets (50 mg total) by mouth every 8 (eight) hours as needed for itching   hydrocortisone 1 % cream   No No   Sig: Apply to affected area 3 times daily as needed   predniSONE 20 mg tablet   No Yes   Sig: Take 2 tablets (40 mg total) by mouth daily for 5 days      Facility-Administered Medications: None       Past Medical History:   Diagnosis Date    No known health problems        Past Surgical History:   Procedure Laterality Date    TONSILLECTOMY         History reviewed  No pertinent family history    I have reviewed and agree with the history as documented  E-Cigarette/Vaping    E-Cigarette Use Never User      E-Cigarette/Vaping Substances     Social History     Tobacco Use    Smoking status: Current Every Day Smoker     Packs/day: 0 50    Smokeless tobacco: Never Used   Substance Use Topics    Alcohol use: Yes     Comment: social    Drug use: Yes     Types: Marijuana        Review of Systems   Respiratory: Negative for shortness of breath  Gastrointestinal: Negative for abdominal pain, diarrhea, nausea and vomiting  Skin: Positive for rash  All other systems reviewed and are negative  Physical Exam  ED Triage Vitals [06/06/21 1350]   Temperature Pulse Respirations Blood Pressure SpO2   98 7 °F (37 1 °C) (!) 116 20 149/98 95 %      Temp Source Heart Rate Source Patient Position - Orthostatic VS BP Location FiO2 (%)   Oral Monitor Sitting Right arm --      Pain Score       --             Orthostatic Vital Signs  Vitals:    06/06/21 1350   BP: 149/98   Pulse: (!) 116   Patient Position - Orthostatic VS: Sitting       Physical Exam  Vitals signs reviewed  Constitutional:       General: He is not in acute distress  Appearance: Normal appearance  He is not ill-appearing, toxic-appearing or diaphoretic  HENT:      Head: Normocephalic and atraumatic  Right Ear: External ear normal       Left Ear: External ear normal       Mouth/Throat:      Mouth: Mucous membranes are moist       Pharynx: Oropharynx is clear  No oropharyngeal exudate or posterior oropharyngeal erythema  Eyes:      General:         Right eye: No discharge  Left eye: No discharge  Extraocular Movements: Extraocular movements intact  Cardiovascular:      Rate and Rhythm: Normal rate and regular rhythm  Pulmonary:      Effort: Pulmonary effort is normal  No respiratory distress  Breath sounds: Normal breath sounds  No stridor  No wheezing, rhonchi or rales  Abdominal:      General: There is no distension  Palpations: Abdomen is soft  Tenderness: There is no abdominal tenderness  There is no guarding or rebound  Musculoskeletal:         General: No deformity or signs of injury  Skin:     General: Skin is warm  Findings: Rash present  Comments: Swelling to periorbital area, mottled like rash to anterior forearms, small patches or urticaria throughout body   Neurological:      General: No focal deficit present  Mental Status: He is alert  Comments: No gross neurological deficit         ED Medications  Medications   hydrOXYzine HCL (ATARAX) tablet 25 mg (25 mg Oral Given 6/6/21 1424)   predniSONE tablet 30 mg (30 mg Oral Given 6/6/21 1424)       Diagnostic Studies  Results Reviewed     None                 No orders to display         Procedures  Procedures      ED Course                             SBIRT 22yo+      Most Recent Value   SBIRT (24 yo +)   In order to provide better care to our patients, we are screening all of our patients for alcohol and drug use  Would it be okay to ask you these screening questions? Yes Filed at: 06/06/2021 1425   Initial Alcohol Screen: US AUDIT-C    1  How often do you have a drink containing alcohol? 1 Filed at: 06/06/2021 1425   2  How many drinks containing alcohol do you have on a typical day you are drinking? 4 Filed at: 06/06/2021 1425   3a  Male UNDER 65: How often do you have five or more drinks on one occasion? 1 Filed at: 06/06/2021 1425   Audit-C Score  6 Filed at: 06/06/2021 1425   MEERA: How many times in the past year have you    Used an illegal drug or used a prescription medication for non-medical reasons? Weekly Filed at: 06/06/2021 1425   DAST-10: In the past 12 months      1  Have you used drugs other than those required for medical reasons? 0 Filed at: 06/06/2021 1425   2  Do you use more than one drug at a time? 0 Filed at: 06/06/2021 1425   3   Have you had medical problems as a result of your drug use (e g , memory loss, hepatitis, convulsions, bleeding, etc )? 0 Filed at: 06/06/2021 1425   4  Have you had "blackouts" or "flashbacks" as a result of drug use? YesNo  0 Filed at: 06/06/2021 1425   5  Do you ever feel bad or guilty about your drug use? 0 Filed at: 06/06/2021 1425   6  Does your spouse (or parent) ever complain about your involvement with drugs? 0 Filed at: 06/06/2021 1425   7  Have you neglected your family because of your use of drugs? 0 Filed at: 06/06/2021 1425   8  Have you engaged in illegal activities in order to obtain drugs? 0 Filed at: 06/06/2021 1425   9  Have you ever experienced withdrawal symptoms (felt sick) when you stopped taking drugs? 0 Filed at: 06/06/2021 1425   10  Are you always able to stop using drugs when you want to?  0 Filed at: 06/06/2021 1425   DAST-10 Score  0 Filed at: 06/06/2021 1425                MDM  Number of Diagnoses or Management Options  Allergic reaction:   Diagnosis management comments: 43-year-old male presents for evaluation of allergic reaction  On examination patient does have shoshana orbital swelling however is able to open his eyes and has intact range of motion  Patient does have a mottling type rash on his arms with areas of urticaria, vital signs are stable  He has no dyspnea or GI involvement  At this time will recommend splitting steroid dose to b i d , will prescribe him prescription Atarax to take instead of Benadryl, will add Pepcid to his regimen  Patient was provided a work note for the 2 days that he was intending to return to work tomorrow  He is to return for worsening symptoms  Disposition  Final diagnoses:    Allergic reaction     Time reflects when diagnosis was documented in both MDM as applicable and the Disposition within this note     Time User Action Codes Description Comment    6/6/2021  2:21 PM Liset Hilliard Allergic reaction       ED Disposition     ED Disposition Condition Date/Time Comment    Discharge Stable Sun Jun 6, 2021  2:21 PM Arianna French discharge to home/self care  Follow-up Information    None         Discharge Medication List as of 6/6/2021  2:27 PM      START taking these medications    Details   famotidine (PEPCID) 20 mg tablet Take 1 tablet (20 mg total) by mouth 2 (two) times a day, Starting Sun 6/6/2021, Print      hydrOXYzine HCL (ATARAX) 25 mg tablet Take 1 tablet (25 mg total) by mouth every 6 (six) hours as needed for itching for up to 20 doses, Starting Sun 6/6/2021, Print      !! predniSONE 20 mg tablet Take 1 5 tablets (30 mg total) by mouth 2 (two) times a day for 4 days, Starting Sun 6/6/2021, Until Thu 6/10/2021, Print       !! - Potential duplicate medications found  Please discuss with provider  CONTINUE these medications which have CHANGED    Details   EPINEPHrine (EPIPEN) 0 3 mg/0 3 mL SOAJ Inject 0 3 mL (0 3 mg total) into a muscle once for 1 dose, Starting Sun 6/6/2021, Print         CONTINUE these medications which have NOT CHANGED    Details   diphenhydrAMINE (BENADRYL) 25 mg tablet Take 2 tablets (50 mg total) by mouth every 8 (eight) hours as needed for itching, Starting Fri 6/4/2021, Normal      !! predniSONE 20 mg tablet Take 2 tablets (40 mg total) by mouth daily for 5 days, Starting Fri 6/4/2021, Until Wed 6/9/2021, Normal      hydrocortisone 1 % cream Apply to affected area 3 times daily as needed, Normal       !! - Potential duplicate medications found  Please discuss with provider  No discharge procedures on file  PDMP Review     None           ED Provider  Attending physically available and evaluated Deadra August  I managed the patient along with the ED Attending      Electronically Signed by         Jose Alfredo Pandey DO  06/07/21 0002

## 2021-06-06 NOTE — Clinical Note
Lauren Anila was seen and treated in our emergency department on 6/6/2021  Diagnosis: Allergic reaction    Raimundo Falling  may return to work on return date  He may return on this date: 06/09/2021         If you have any questions or concerns, please don't hesitate to call        Miguelina Rosenthal, DO    ______________________________           _______________          _______________  Hospital Representative                              Date                                Time

## 2021-07-06 ENCOUNTER — TELEPHONE (OUTPATIENT)
Dept: FAMILY MEDICINE CLINIC | Facility: CLINIC | Age: 36
End: 2021-07-06

## 2021-07-06 NOTE — TELEPHONE ENCOUNTER
LMOM for pt to call back, as he has not been seen since 2017  Calling to see if we are still his PCP  If so, pt needs to schedule an appt for a physical/re-establish care  If not, we need the name of his current PCP to update his chart

## 2022-05-31 ENCOUNTER — APPOINTMENT (EMERGENCY)
Dept: ULTRASOUND IMAGING | Facility: HOSPITAL | Age: 37
End: 2022-05-31
Payer: COMMERCIAL

## 2022-05-31 ENCOUNTER — APPOINTMENT (EMERGENCY)
Dept: CT IMAGING | Facility: HOSPITAL | Age: 37
End: 2022-05-31
Payer: COMMERCIAL

## 2022-05-31 ENCOUNTER — HOSPITAL ENCOUNTER (EMERGENCY)
Facility: HOSPITAL | Age: 37
Discharge: HOME | End: 2022-05-31
Attending: EMERGENCY MEDICINE
Payer: COMMERCIAL

## 2022-05-31 VITALS
SYSTOLIC BLOOD PRESSURE: 140 MMHG | BODY MASS INDEX: 29.8 KG/M2 | OXYGEN SATURATION: 98 % | HEART RATE: 84 BPM | DIASTOLIC BLOOD PRESSURE: 79 MMHG | RESPIRATION RATE: 16 BRPM | HEIGHT: 72 IN | WEIGHT: 220.02 LBS | TEMPERATURE: 98.5 F

## 2022-05-31 DIAGNOSIS — R10.9 RIGHT FLANK PAIN: ICD-10-CM

## 2022-05-31 DIAGNOSIS — R11.2 NAUSEA AND VOMITING: ICD-10-CM

## 2022-05-31 DIAGNOSIS — K80.20 CALCULUS OF GALLBLADDER WITHOUT CHOLECYSTITIS WITHOUT OBSTRUCTION: Primary | ICD-10-CM

## 2022-05-31 LAB
ALBUMIN SERPL BCP-MCNC: 3.9 G/DL (ref 3.5–5)
ALP SERPL-CCNC: 105 U/L (ref 46–116)
ALT SERPL W P-5'-P-CCNC: 35 U/L (ref 12–78)
ANION GAP SERPL CALCULATED.3IONS-SCNC: 10 MMOL/L (ref 4–13)
AST SERPL W P-5'-P-CCNC: 20 U/L (ref 5–45)
BACTERIA UR QL AUTO: ABNORMAL /HPF
BASOPHILS # BLD AUTO: 0.03 THOUSANDS/ΜL (ref 0–0.1)
BASOPHILS NFR BLD AUTO: 0 % (ref 0–1)
BILIRUB SERPL-MCNC: 0.24 MG/DL (ref 0.2–1)
BILIRUB UR QL STRIP: ABNORMAL
BUN SERPL-MCNC: 15 MG/DL (ref 5–25)
CALCIUM SERPL-MCNC: 9 MG/DL (ref 8.3–10.1)
CHLORIDE SERPL-SCNC: 105 MMOL/L (ref 100–108)
CLARITY UR: CLEAR
CO2 SERPL-SCNC: 26 MMOL/L (ref 21–32)
COLOR UR: YELLOW
CREAT SERPL-MCNC: 1.13 MG/DL (ref 0.6–1.3)
EOSINOPHIL # BLD AUTO: 0.07 THOUSAND/ΜL (ref 0–0.61)
EOSINOPHIL NFR BLD AUTO: 1 % (ref 0–6)
ERYTHROCYTE [DISTWIDTH] IN BLOOD BY AUTOMATED COUNT: 12.8 % (ref 11.6–15.1)
GFR SERPL CREATININE-BSD FRML MDRD: 83 ML/MIN/1.73SQ M
GLUCOSE SERPL-MCNC: 83 MG/DL (ref 65–140)
GLUCOSE UR STRIP-MCNC: NEGATIVE MG/DL
HCT VFR BLD AUTO: 47.8 % (ref 36.5–49.3)
HGB BLD-MCNC: 16.3 G/DL (ref 12–17)
HGB UR QL STRIP.AUTO: ABNORMAL
IMM GRANULOCYTES # BLD AUTO: 0.02 THOUSAND/UL (ref 0–0.2)
IMM GRANULOCYTES NFR BLD AUTO: 0 % (ref 0–2)
KETONES UR STRIP-MCNC: ABNORMAL MG/DL
LEUKOCYTE ESTERASE UR QL STRIP: NEGATIVE
LIPASE SERPL-CCNC: 164 U/L (ref 73–393)
LYMPHOCYTES # BLD AUTO: 3.28 THOUSANDS/ΜL (ref 0.6–4.47)
LYMPHOCYTES NFR BLD AUTO: 40 % (ref 14–44)
MCH RBC QN AUTO: 32.7 PG (ref 26.8–34.3)
MCHC RBC AUTO-ENTMCNC: 34.1 G/DL (ref 31.4–37.4)
MCV RBC AUTO: 96 FL (ref 82–98)
MONOCYTES # BLD AUTO: 0.59 THOUSAND/ΜL (ref 0.17–1.22)
MONOCYTES NFR BLD AUTO: 7 % (ref 4–12)
MUCOUS THREADS UR QL AUTO: ABNORMAL
NEUTROPHILS # BLD AUTO: 4.28 THOUSANDS/ΜL (ref 1.85–7.62)
NEUTS SEG NFR BLD AUTO: 52 % (ref 43–75)
NITRITE UR QL STRIP: NEGATIVE
NON-SQ EPI CELLS URNS QL MICRO: ABNORMAL /HPF
NRBC BLD AUTO-RTO: 0 /100 WBCS
PH UR STRIP.AUTO: 5.5 [PH] (ref 4.5–8)
PLATELET # BLD AUTO: 253 THOUSANDS/UL (ref 149–390)
PMV BLD AUTO: 10.6 FL (ref 8.9–12.7)
POTASSIUM SERPL-SCNC: 4.1 MMOL/L (ref 3.5–5.3)
PROT SERPL-MCNC: 7.6 G/DL (ref 6.4–8.2)
PROT UR STRIP-MCNC: NEGATIVE MG/DL
RBC # BLD AUTO: 4.99 MILLION/UL (ref 3.88–5.62)
RBC #/AREA URNS AUTO: ABNORMAL /HPF
SODIUM SERPL-SCNC: 141 MMOL/L (ref 136–145)
SP GR UR STRIP.AUTO: >=1.03 (ref 1–1.03)
UROBILINOGEN UR QL STRIP.AUTO: 0.2 E.U./DL
WBC # BLD AUTO: 8.27 THOUSAND/UL (ref 4.31–10.16)
WBC #/AREA URNS AUTO: ABNORMAL /HPF

## 2022-05-31 PROCEDURE — 36415 COLL VENOUS BLD VENIPUNCTURE: CPT | Performed by: PHYSICIAN ASSISTANT

## 2022-05-31 PROCEDURE — 99284 EMERGENCY DEPT VISIT MOD MDM: CPT

## 2022-05-31 PROCEDURE — 76705 ECHO EXAM OF ABDOMEN: CPT

## 2022-05-31 PROCEDURE — 74176 CT ABD & PELVIS W/O CONTRAST: CPT

## 2022-05-31 PROCEDURE — 81001 URINALYSIS AUTO W/SCOPE: CPT

## 2022-05-31 PROCEDURE — 83690 ASSAY OF LIPASE: CPT | Performed by: PHYSICIAN ASSISTANT

## 2022-05-31 PROCEDURE — 99285 EMERGENCY DEPT VISIT HI MDM: CPT | Performed by: PHYSICIAN ASSISTANT

## 2022-05-31 PROCEDURE — G1004 CDSM NDSC: HCPCS

## 2022-05-31 PROCEDURE — 80053 COMPREHEN METABOLIC PANEL: CPT | Performed by: PHYSICIAN ASSISTANT

## 2022-05-31 PROCEDURE — 85025 COMPLETE CBC W/AUTO DIFF WBC: CPT | Performed by: PHYSICIAN ASSISTANT

## 2022-05-31 RX ORDER — ONDANSETRON 4 MG/1
4 TABLET, ORALLY DISINTEGRATING ORAL EVERY 6 HOURS PRN
Qty: 20 TABLET | Refills: 0 | Status: SHIPPED | OUTPATIENT
Start: 2022-05-31

## 2022-05-31 RX ORDER — OXYCODONE HYDROCHLORIDE AND ACETAMINOPHEN 5; 325 MG/1; MG/1
1 TABLET ORAL ONCE
Status: COMPLETED | OUTPATIENT
Start: 2022-05-31 | End: 2022-05-31

## 2022-05-31 RX ORDER — FAMOTIDINE 20 MG/1
20 TABLET, FILM COATED ORAL 2 TIMES DAILY
Qty: 30 TABLET | Refills: 0 | Status: SHIPPED | OUTPATIENT
Start: 2022-05-31

## 2022-05-31 RX ADMIN — OXYCODONE HYDROCHLORIDE AND ACETAMINOPHEN 1 TABLET: 5; 325 TABLET ORAL at 17:54

## 2022-05-31 NOTE — ED PROVIDER NOTES
History  Chief Complaint   Patient presents with    Flank Pain     Patient c/o R sided flank pain worsening when urinating  Denies fevers  Patient is a 39 y/o male no pmhx presenting to the ED for evaluation of flank pain  Pt states a few days ago he began with onset of right sided flank pain with radiating pain to his right axillary side  Pt states he feels worsening pain when he eats and urinates  Pt states he had to leave work today 2/2 pain  Pt reporting nausea and an episode of NBNB vomiting  Pt did not take any medication for sx  Pt denies fever, chills, chest pain, SOB, abdominal pain, diarrhea, hematuria, testicular pain/swelling  Prior to Admission Medications   Prescriptions Last Dose Informant Patient Reported? Taking? EPINEPHrine (EPIPEN) 0 3 mg/0 3 mL SOAJ   No No   Sig: Inject 0 3 mL (0 3 mg total) into a muscle once for 1 dose   EPINEPHrine (EPIPEN) 0 3 mg/0 3 mL SOAJ   No No   Sig: Inject 0 3 mL (0 3 mg total) into a muscle once for 1 dose   diphenhydrAMINE (BENADRYL) 25 mg tablet   No No   Sig: Take 2 tablets (50 mg total) by mouth every 8 (eight) hours as needed for itching   famotidine (PEPCID) 20 mg tablet   No No   Sig: Take 1 tablet (20 mg total) by mouth 2 (two) times a day   hydrOXYzine HCL (ATARAX) 25 mg tablet   No No   Sig: Take 1 tablet (25 mg total) by mouth every 6 (six) hours as needed for itching for up to 20 doses   hydrocortisone 1 % cream   No No   Sig: Apply to affected area 3 times daily as needed      Facility-Administered Medications: None       Past Medical History:   Diagnosis Date    No known health problems        Past Surgical History:   Procedure Laterality Date    TONSILLECTOMY         History reviewed  No pertinent family history  I have reviewed and agree with the history as documented      E-Cigarette/Vaping    E-Cigarette Use Never User      E-Cigarette/Vaping Substances     Social History     Tobacco Use    Smoking status: Current Every Day Smoker     Packs/day: 0 50    Smokeless tobacco: Never Used   Vaping Use    Vaping Use: Never used   Substance Use Topics    Alcohol use: Yes     Comment: social    Drug use: Yes     Types: Marijuana       Review of Systems   Constitutional: Negative for chills and fever  HENT: Negative for congestion, ear pain and sore throat  Eyes: Negative for visual disturbance  Respiratory: Negative for cough and shortness of breath  Cardiovascular: Negative for chest pain  Gastrointestinal: Positive for nausea and vomiting  Negative for abdominal pain and diarrhea  Genitourinary: Positive for dysuria and flank pain  Negative for hematuria  Musculoskeletal: Negative for back pain and neck pain  Skin: Negative for rash  Neurological: Negative for dizziness, speech difficulty, weakness and headaches  Psychiatric/Behavioral: Negative for confusion  Physical Exam  Physical Exam  Constitutional:       General: He is not in acute distress  Appearance: He is well-developed  He is not ill-appearing, toxic-appearing or diaphoretic  HENT:      Head: Normocephalic and atraumatic  Right Ear: External ear normal       Left Ear: External ear normal       Nose: Nose normal       Mouth/Throat:      Lips: Pink  Mouth: Mucous membranes are moist    Eyes:      Extraocular Movements: Extraocular movements intact  Conjunctiva/sclera: Conjunctivae normal    Cardiovascular:      Rate and Rhythm: Normal rate and regular rhythm  Pulmonary:      Effort: Pulmonary effort is normal       Breath sounds: Normal breath sounds  No decreased breath sounds, wheezing, rhonchi or rales  Abdominal:      General: Abdomen is flat  There is no distension  Palpations: Abdomen is soft  Tenderness: There is no abdominal tenderness  There is no right CVA tenderness, left CVA tenderness, guarding or rebound  Musculoskeletal:      Cervical back: Normal range of motion and neck supple  Back:    Skin:     General: Skin is warm  Capillary Refill: Capillary refill takes less than 2 seconds  Coloration: Skin is not jaundiced or pale  Findings: No rash  Neurological:      Mental Status: He is alert and oriented to person, place, and time     Psychiatric:         Mood and Affect: Mood and affect normal          Speech: Speech normal          Vital Signs  ED Triage Vitals   Temperature Pulse Respirations Blood Pressure SpO2   05/31/22 1520 05/31/22 1520 05/31/22 1520 05/31/22 1522 05/31/22 1520   98 5 °F (36 9 °C) 86 18 125/75 97 %      Temp Source Heart Rate Source Patient Position - Orthostatic VS BP Location FiO2 (%)   05/31/22 1520 05/31/22 1520 05/31/22 1522 05/31/22 1522 --   Oral Monitor Sitting Right arm       Pain Score       05/31/22 1520       6           Vitals:    05/31/22 1520 05/31/22 1522 05/31/22 1755   BP:  125/75 140/79   Pulse: 86  84   Patient Position - Orthostatic VS:  Sitting Sitting         Visual Acuity      ED Medications  Medications   oxyCODONE-acetaminophen (PERCOCET) 5-325 mg per tablet 1 tablet (1 tablet Oral Given 5/31/22 1754)       Diagnostic Studies  Results Reviewed     Procedure Component Value Units Date/Time    Comprehensive metabolic panel [[id number]] Collected: 05/31/22 2005    Lab Status: Final result Specimen: Blood from Arm, Right Updated: 05/31/22 2058     Sodium 141 mmol/L      Potassium 4 1 mmol/L      Chloride 105 mmol/L      CO2 26 mmol/L      ANION GAP 10 mmol/L      BUN 15 mg/dL      Creatinine 1 13 mg/dL      Glucose 83 mg/dL      Calcium 9 0 mg/dL      AST 20 U/L      ALT 35 U/L      Alkaline Phosphatase 105 U/L      Total Protein 7 6 g/dL      Albumin 3 9 g/dL      Total Bilirubin 0 24 mg/dL      eGFR 83 ml/min/1 73sq m     Narrative:      Cely guidelines for Chronic Kidney Disease (CKD):     Stage 1 with normal or high GFR (GFR > 90 mL/min/1 73 square meters)    Stage 2 Mild CKD (GFR = 60-89 mL/min/1 73 square meters)    Stage 3A Moderate CKD (GFR = 45-59 mL/min/1 73 square meters)    Stage 3B Moderate CKD (GFR = 30-44 mL/min/1 73 square meters)    Stage 4 Severe CKD (GFR = 15-29 mL/min/1 73 square meters)    Stage 5 End Stage CKD (GFR <15 mL/min/1 73 square meters)  Note: GFR calculation is accurate only with a steady state creatinine    Lipase [[id number]]  (Normal) Collected: 05/31/22 2005    Lab Status: Final result Specimen: Blood from Arm, Right Updated: 05/31/22 2058     Lipase 164 u/L     CBC and differential [[id number]] Collected: 05/31/22 2005    Lab Status: Final result Specimen: Blood from Arm, Right Updated: 05/31/22 2028     WBC 8 27 Thousand/uL      RBC 4 99 Million/uL      Hemoglobin 16 3 g/dL      Hematocrit 47 8 %      MCV 96 fL      MCH 32 7 pg      MCHC 34 1 g/dL      RDW 12 8 %      MPV 10 6 fL      Platelets 210 Thousands/uL      nRBC 0 /100 WBCs      Neutrophils Relative 52 %      Immat GRANS % 0 %      Lymphocytes Relative 40 %      Monocytes Relative 7 %      Eosinophils Relative 1 %      Basophils Relative 0 %      Neutrophils Absolute 4 28 Thousands/µL      Immature Grans Absolute 0 02 Thousand/uL      Lymphocytes Absolute 3 28 Thousands/µL      Monocytes Absolute 0 59 Thousand/µL      Eosinophils Absolute 0 07 Thousand/µL      Basophils Absolute 0 03 Thousands/µL     Urine Microscopic [[id number]]  (Abnormal) Collected: 05/31/22 1712    Lab Status: Final result Specimen: Urine, Clean Catch Updated: 05/31/22 1810     RBC, UA 4-10 /hpf      WBC, UA 4-10 /hpf      Epithelial Cells Moderate /hpf      Bacteria, UA Occasional /hpf      MUCUS THREADS Moderate    Urine Macroscopic, POC [[id number]]  (Abnormal) Collected: 05/31/22 1712    Lab Status: Final result Specimen: Urine Updated: 05/31/22 1714     Color, UA Yellow     Clarity, UA Clear     pH, UA 5 5     Leukocytes, UA Negative     Nitrite, UA Negative     Protein, UA Negative mg/dl      Glucose, UA Negative mg/dl Ketones, UA Trace mg/dl      Urobilinogen, UA 0 2 E U /dl      Bilirubin, UA Interference- unable to analyze     Blood, UA Trace     Specific Gravity, UA >=1 030    Narrative:      CLINITEK RESULT                 US right upper quadrant   Final Result by Amos oWods DO (05/31 2108)   1  Cholelithiasis and biliary sludge without gallbladder wall thickening or pericholecystic fluid  Please note that sonographic Reynolds's sign cannot be accurately assessed as the patient was administered pain medication prior to imaging  Workstation performed: [id number]         CT abdomen pelvis wo contrast   Final Result by Chilo Lopes MD (05/31 1928)      1  Calcified gallstones with suspected subtle cholecystic inflammatory changes, concerning for acute cholecystitis  Further clinical assessment advised  2   2 mm nonobstructing intrarenal calculus in left kidney  No hydronephrosis  3   Mild diverticulosis coli  I personally discussed this study with Bryce Olivas on 5/31/2022 at 7:25 PM                      Workstation performed: [id number]                    Procedures  Procedures         ED Course                               SBIRT 22yo+    Flowsheet Row Most Recent Value   SBIRT (25 yo +)    In order to provide better care to our patients, we are screening all of our patients for alcohol and drug use  Would it be okay to ask you these screening questions? No Filed at: 05/31/2022 1709                    [facility]  Number of Diagnoses or Management Options  Calculus of gallbladder without cholecystitis without obstruction  Nausea and vomiting  Right flank pain  Diagnosis management comments: Patient is a 39 y/o male no pmhx presenting to the ED for evaluation of flank pain       Will obtain urine, labs, CT a/p   Sign out to [employer] pending U/S RUQ      Disposition  Final diagnoses:   Calculus of gallbladder without cholecystitis without obstruction   Right flank pain   Nausea and vomiting - only intermittently with eating     Time reflects when diagnosis was documented in both MDM as applicable and the Disposition within this note     Time User Action Codes Description Comment    5/31/2022  9:21 PM CostEvan mcintosh Add [K80 20] Calculus of gallbladder without cholecystitis without obstruction     5/31/2022  9:21 PM CostEvan mcintosh Add [R10 9] Right flank pain     5/31/2022  9:22 PM [place], [address] [R11 2] Nausea and vomiting     5/31/2022  9:22 PM CostEvan mcintosh Modify [R11 2] Nausea and vomiting only intermittently with eating      ED Disposition     ED Disposition   Discharge    Condition   Stable    Date/Time   Tue May 31, 2022  9:22 PM    Comment   Gertrude Link discharge to home/self care  Follow-up Information     Follow up With Specialties Details Why [address] [place] [address] Emergency Department Emergency Medicine  If symptoms worsen [facility] [phone]  [address] [facility] Emergency Department, [address] [place] [address] , [facility], [address] [facility], [address] [place] [address] General Surgery Schedule an appointment as soon as possible for a visit   [address] [address] [id number]  [address] [place] [address] [address], [address], [place], [address] [facility], [address] [place] [address]          Discharge Medication List as of 5/31/2022  9:23 PM      START taking these medications    Details   !! famotidine (PEPCID) 20 mg tablet Take 1 tablet (20 mg total) by mouth 2 (two) times a day, Starting Tue 5/31/2022, Normal      ondansetron (ZOFRAN-ODT) 4 mg disintegrating tablet Take 1 tablet (4 mg total) by mouth every 6 (six) hours as needed for nausea or vomiting, Starting Tue 5/31/2022, Normal       !! - Potential duplicate medications found  Please discuss with provider        CONTINUE these medications which have NOT CHANGED Details   diphenhydrAMINE (BENADRYL) 25 mg tablet Take 2 tablets (50 mg total) by mouth every 8 (eight) hours as needed for itching, Starting Fri 6/4/2021, Normal      EPINEPHrine (EPIPEN) 0 3 mg/0 3 mL SOAJ Inject 0 3 mL (0 3 mg total) into a muscle once for 1 dose, Starting Fri 6/4/2021, Normal      EPINEPHrine (EPIPEN) 0 3 mg/0 3 mL SOAJ Inject 0 3 mL (0 3 mg total) into a muscle once for 1 dose, Starting Sun 6/6/2021, Print      !! famotidine (PEPCID) 20 mg tablet Take 1 tablet (20 mg total) by mouth 2 (two) times a day, Starting Sun 6/6/2021, Print      hydrocortisone 1 % cream Apply to affected area 3 times daily as needed, Normal      hydrOXYzine HCL (ATARAX) 25 mg tablet Take 1 tablet (25 mg total) by mouth every 6 (six) hours as needed for itching for up to 20 doses, Starting Sun 6/6/2021, Print       !! - Potential duplicate medications found  Please discuss with provider  No discharge procedures on file      PDMP Review     None          ED Provider  Electronically Signed by           Arti Tate PA-C  06/02/22 [id number]

## 2022-05-31 NOTE — Clinical Note
Amy Jorge was seen and treated in our emergency department on 5/31/2022  Diagnosis:     Aldo Sahni  may return to work on return date  He may return on this date: 06/02/2022         If you have any questions or concerns, please don't hesitate to call        Rigoberto Ruiz MD    ______________________________           _______________          _______________  Hospital Representative                              Date                                Time

## 2022-06-01 NOTE — DISCHARGE INSTRUCTIONS
Take Zofran as prescribed as needed for nausea and vomiting  Take Motrin or Tylenol for pain  Rest and drink plenty of fluids  Slow introduction of foods like broth, bread, rice, and other bland foods  Avoid fatty, greasy foods  Follow-up with PCP for monitoring of symptoms  Follow-up with general surgery for further evaluation of symptoms  Return to ED if symptoms worsen including increasing pain, inability to tolerate food or fluid, blood in vomit or stool, fevers

## 2024-01-29 ENCOUNTER — APPOINTMENT (EMERGENCY)
Dept: ULTRASOUND IMAGING | Facility: HOSPITAL | Age: 39
End: 2024-01-29

## 2024-01-29 ENCOUNTER — APPOINTMENT (EMERGENCY)
Dept: CT IMAGING | Facility: HOSPITAL | Age: 39
End: 2024-01-29

## 2024-01-29 ENCOUNTER — HOSPITAL ENCOUNTER (EMERGENCY)
Facility: HOSPITAL | Age: 39
Discharge: HOME | End: 2024-01-29
Attending: EMERGENCY MEDICINE

## 2024-01-29 VITALS
SYSTOLIC BLOOD PRESSURE: 108 MMHG | OXYGEN SATURATION: 98 % | WEIGHT: 217.59 LBS | HEIGHT: 72 IN | TEMPERATURE: 98.3 F | RESPIRATION RATE: 16 BRPM | DIASTOLIC BLOOD PRESSURE: 61 MMHG | BODY MASS INDEX: 29.47 KG/M2 | HEART RATE: 79 BPM

## 2024-01-29 DIAGNOSIS — K80.20 CHOLELITHIASIS: ICD-10-CM

## 2024-01-29 DIAGNOSIS — R10.9 ABDOMINAL PAIN: Primary | ICD-10-CM

## 2024-01-29 LAB
ALBUMIN SERPL BCP-MCNC: 4.6 G/DL (ref 3.5–5)
ALP SERPL-CCNC: 79 U/L (ref 34–104)
ALT SERPL W P-5'-P-CCNC: 36 U/L (ref 7–52)
ANION GAP SERPL CALCULATED.3IONS-SCNC: 7 MMOL/L
AST SERPL W P-5'-P-CCNC: 23 U/L (ref 13–39)
BASOPHILS # BLD AUTO: 0.04 THOUSANDS/ÂΜL (ref 0–0.1)
BASOPHILS NFR BLD AUTO: 1 % (ref 0–1)
BILIRUB SERPL-MCNC: 0.44 MG/DL (ref 0.2–1)
BUN SERPL-MCNC: 12 MG/DL (ref 5–25)
CALCIUM SERPL-MCNC: 9.4 MG/DL (ref 8.4–10.2)
CHLORIDE SERPL-SCNC: 104 MMOL/L (ref 96–108)
CO2 SERPL-SCNC: 26 MMOL/L (ref 21–32)
CREAT SERPL-MCNC: 0.91 MG/DL (ref 0.6–1.3)
EOSINOPHIL # BLD AUTO: 0.03 THOUSAND/ÂΜL (ref 0–0.61)
EOSINOPHIL NFR BLD AUTO: 0 % (ref 0–6)
ERYTHROCYTE [DISTWIDTH] IN BLOOD BY AUTOMATED COUNT: 12.7 % (ref 11.6–15.1)
GFR SERPL CREATININE-BSD FRML MDRD: 107 ML/MIN/1.73SQ M
GLUCOSE SERPL-MCNC: 92 MG/DL (ref 65–140)
HCT VFR BLD AUTO: 49.1 % (ref 36.5–49.3)
HGB BLD-MCNC: 17 G/DL (ref 12–17)
IMM GRANULOCYTES # BLD AUTO: 0.02 THOUSAND/UL (ref 0–0.2)
IMM GRANULOCYTES NFR BLD AUTO: 0 % (ref 0–2)
LIPASE SERPL-CCNC: 294 U/L (ref 11–82)
LYMPHOCYTES # BLD AUTO: 2.73 THOUSANDS/ÂΜL (ref 0.6–4.47)
LYMPHOCYTES NFR BLD AUTO: 34 % (ref 14–44)
MCH RBC QN AUTO: 32 PG (ref 26.8–34.3)
MCHC RBC AUTO-ENTMCNC: 34.6 G/DL (ref 31.4–37.4)
MCV RBC AUTO: 93 FL (ref 82–98)
MONOCYTES # BLD AUTO: 0.55 THOUSAND/ÂΜL (ref 0.17–1.22)
MONOCYTES NFR BLD AUTO: 7 % (ref 4–12)
NEUTROPHILS # BLD AUTO: 4.71 THOUSANDS/ÂΜL (ref 1.85–7.62)
NEUTS SEG NFR BLD AUTO: 58 % (ref 43–75)
NRBC BLD AUTO-RTO: 0 /100 WBCS
PLATELET # BLD AUTO: 270 THOUSANDS/UL (ref 149–390)
PMV BLD AUTO: 10 FL (ref 8.9–12.7)
POTASSIUM SERPL-SCNC: 3.8 MMOL/L (ref 3.5–5.3)
PROT SERPL-MCNC: 7.9 G/DL (ref 6.4–8.4)
RBC # BLD AUTO: 5.31 MILLION/UL (ref 3.88–5.62)
SODIUM SERPL-SCNC: 137 MMOL/L (ref 135–147)
WBC # BLD AUTO: 8.08 THOUSAND/UL (ref 4.31–10.16)

## 2024-01-29 PROCEDURE — 85025 COMPLETE CBC W/AUTO DIFF WBC: CPT

## 2024-01-29 PROCEDURE — 80053 COMPREHEN METABOLIC PANEL: CPT

## 2024-01-29 PROCEDURE — 99284 EMERGENCY DEPT VISIT MOD MDM: CPT

## 2024-01-29 PROCEDURE — 96361 HYDRATE IV INFUSION ADD-ON: CPT

## 2024-01-29 PROCEDURE — 36415 COLL VENOUS BLD VENIPUNCTURE: CPT

## 2024-01-29 PROCEDURE — 83690 ASSAY OF LIPASE: CPT

## 2024-01-29 PROCEDURE — 96374 THER/PROPH/DIAG INJ IV PUSH: CPT

## 2024-01-29 PROCEDURE — 76705 ECHO EXAM OF ABDOMEN: CPT

## 2024-01-29 PROCEDURE — 99285 EMERGENCY DEPT VISIT HI MDM: CPT | Performed by: EMERGENCY MEDICINE

## 2024-01-29 PROCEDURE — 99243 OFF/OP CNSLTJ NEW/EST LOW 30: CPT | Performed by: SURGERY

## 2024-01-29 PROCEDURE — G1004 CDSM NDSC: HCPCS

## 2024-01-29 PROCEDURE — 74177 CT ABD & PELVIS W/CONTRAST: CPT

## 2024-01-29 RX ORDER — MAGNESIUM HYDROXIDE/ALUMINUM HYDROXICE/SIMETHICONE 120; 1200; 1200 MG/30ML; MG/30ML; MG/30ML
30 SUSPENSION ORAL ONCE
Status: COMPLETED | OUTPATIENT
Start: 2024-01-29 | End: 2024-01-29

## 2024-01-29 RX ORDER — ONDANSETRON 2 MG/ML
4 INJECTION INTRAMUSCULAR; INTRAVENOUS ONCE
Status: COMPLETED | OUTPATIENT
Start: 2024-01-29 | End: 2024-01-29

## 2024-01-29 RX ORDER — FAMOTIDINE 20 MG/1
20 TABLET, FILM COATED ORAL ONCE
Status: COMPLETED | OUTPATIENT
Start: 2024-01-29 | End: 2024-01-29

## 2024-01-29 RX ADMIN — ONDANSETRON 4 MG: 2 INJECTION INTRAMUSCULAR; INTRAVENOUS at 15:57

## 2024-01-29 RX ADMIN — ALUMINUM HYDROXIDE, MAGNESIUM HYDROXIDE, AND DIMETHICONE 30 ML: 200; 20; 200 SUSPENSION ORAL at 16:35

## 2024-01-29 RX ADMIN — SODIUM CHLORIDE 500 ML: 0.9 INJECTION, SOLUTION INTRAVENOUS at 15:56

## 2024-01-29 RX ADMIN — FAMOTIDINE 20 MG: 20 TABLET, FILM COATED ORAL at 16:35

## 2024-01-29 RX ADMIN — IOHEXOL 100 ML: 350 INJECTION, SOLUTION INTRAVENOUS at 16:42

## 2024-01-29 NOTE — ED PROVIDER NOTES
History  Chief Complaint   Patient presents with    Abdominal Pain     Pt reports worsening chronic abdominal pain. Pt reports diagnosed with gallstones 2 years ago and pain has been worsening since. Pt reports few episodes of diarrhea today with loss of appetite.      Patient is a 38M pmh chronic abd pain p/w acute worsening abd pain. Usually able to tolerate PO, now eating less as pain worsens with eating. Constantly nauseous, no acute nausea, no vomiting, no change in bowel habits. Pain is periumbilical, radiates up to chest. Worsens if he lays flat. Does not take medications for GERD. Patient has a high fat diet. Occasionally takes antacids. Has not followed up with surgery for cholelithiasis 2/2 lack of insurance.  Review of systems performed and negative aside from what is noted above.        Prior to Admission Medications   Prescriptions Last Dose Informant Patient Reported? Taking?   EPINEPHrine (EPIPEN) 0.3 mg/0.3 mL SOAJ   No No   Sig: Inject 0.3 mL (0.3 mg total) into a muscle once for 1 dose   diphenhydrAMINE (BENADRYL) 25 mg tablet Not Taking  No No   Sig: Take 2 tablets (50 mg total) by mouth every 8 (eight) hours as needed for itching   Patient not taking: Reported on 1/29/2024   famotidine (PEPCID) 20 mg tablet   No Yes   Sig: Take 1 tablet (20 mg total) by mouth 2 (two) times a day   Patient taking differently: Take 20 mg by mouth 2 (two) times a day Takes PRN   famotidine (PEPCID) 20 mg tablet   No No   Sig: Take 1 tablet (20 mg total) by mouth 2 (two) times a day   hydrOXYzine HCL (ATARAX) 25 mg tablet Not Taking  No No   Sig: Take 1 tablet (25 mg total) by mouth every 6 (six) hours as needed for itching for up to 20 doses   Patient not taking: Reported on 1/29/2024   hydrocortisone 1 % cream Not Taking  No No   Sig: Apply to affected area 3 times daily as needed   Patient not taking: Reported on 1/29/2024   ondansetron (ZOFRAN-ODT) 4 mg disintegrating tablet Not Taking  No No   Sig: Take 1 tablet  (4 mg total) by mouth every 6 (six) hours as needed for nausea or vomiting   Patient not taking: Reported on 1/29/2024      Facility-Administered Medications: None       Past Medical History:   Diagnosis Date    No known health problems        Past Surgical History:   Procedure Laterality Date    TONSILLECTOMY         History reviewed. No pertinent family history.  I have reviewed and agree with the history as documented.    E-Cigarette/Vaping    E-Cigarette Use Never User      E-Cigarette/Vaping Substances     Social History     Tobacco Use    Smoking status: Every Day     Current packs/day: 0.50     Types: Cigarettes    Smokeless tobacco: Never   Vaping Use    Vaping status: Never Used   Substance Use Topics    Alcohol use: Not Currently     Comment: Rarely    Drug use: Yes     Types: Marijuana     Comment: daily        Review of Systems   All other systems reviewed and are negative.      Physical Exam  ED Triage Vitals   Temperature Pulse Respirations Blood Pressure SpO2   01/29/24 1500 01/29/24 1457 01/29/24 1457 01/29/24 1457 01/29/24 1457   98.3 °F (36.8 °C) 99 18 146/75 96 %      Temp Source Heart Rate Source Patient Position - Orthostatic VS BP Location FiO2 (%)   01/29/24 1500 01/29/24 1457 01/29/24 1457 01/29/24 1457 --   Oral Monitor Sitting Right arm       Pain Score       01/29/24 1457       4             Orthostatic Vital Signs  Vitals:    01/29/24 1457 01/29/24 1713 01/29/24 1924   BP: 146/75 110/65 108/61   Pulse: 99 83 79   Patient Position - Orthostatic VS: Sitting  Lying       Physical Exam  Vitals and nursing note reviewed.   Constitutional:       General: He is not in acute distress.     Appearance: He is well-developed.   HENT:      Head: Normocephalic and atraumatic.   Eyes:      Conjunctiva/sclera: Conjunctivae normal.   Cardiovascular:      Rate and Rhythm: Normal rate and regular rhythm.      Heart sounds: No murmur heard.  Pulmonary:      Effort: Pulmonary effort is normal. No respiratory  distress.      Breath sounds: Normal breath sounds.   Abdominal:      Palpations: Abdomen is soft.      Tenderness: There is abdominal tenderness in the epigastric area and periumbilical area. There is no right CVA tenderness, left CVA tenderness, guarding or rebound. Negative signs include Reynolds's sign.   Musculoskeletal:         General: No swelling.      Cervical back: Neck supple.   Skin:     General: Skin is warm and dry.      Capillary Refill: Capillary refill takes less than 2 seconds.   Neurological:      Mental Status: He is alert.   Psychiatric:         Mood and Affect: Mood normal.         ED Medications  Medications   ondansetron (ZOFRAN) injection 4 mg (4 mg Intravenous Given 1/29/24 1557)   aluminum-magnesium hydroxide-simethicone (MAALOX) oral suspension 30 mL (30 mL Oral Given 1/29/24 1635)   famotidine (PEPCID) tablet 20 mg (20 mg Oral Given 1/29/24 1635)   sodium chloride 0.9 % bolus 500 mL (0 mL Intravenous Stopped 1/29/24 1713)   iohexol (OMNIPAQUE) 350 MG/ML injection (SINGLE-DOSE) 100 mL (100 mL Intravenous Given 1/29/24 1642)       Diagnostic Studies  Results Reviewed       Procedure Component Value Units Date/Time    [facility] [[id number]] Collected: 01/29/24 1557    Lab Status: Final result Specimen: Blood from Arm, Right Updated: 01/29/24 1623     Sodium 137 mmol/L      Potassium 3.8 mmol/L      Chloride 104 mmol/L      CO2 26 mmol/L      ANION GAP 7 mmol/L      BUN 12 mg/dL      Creatinine 0.91 mg/dL      Glucose 92 mg/dL      Calcium 9.4 mg/dL      AST 23 U/L      ALT 36 U/L      Alkaline Phosphatase 79 U/L      Total Protein 7.9 g/dL      Albumin 4.6 g/dL      Total Bilirubin 0.44 mg/dL      eGFR 107 ml/min/1.73sq m     Narrative:      National Kid[employer] Foundation guidelines for Chronic Kidney Disease (CKD):     Stage 1 with normal or high GFR (GFR > 90 mL/min/1.73 square meters)    Stage 2 Mild CKD (GFR = 60-89 mL/min/1.73 square meters)    Stage 3A Moderate CKD (GFR = 45-59 mL/min/1.73  square meters)    Stage 3B Moderate CKD (GFR = 30-44 mL/min/1.73 square meters)    Stage 4 Severe CKD (GFR = 15-29 mL/min/1.73 square meters)    Stage 5 End Stage CKD (GFR <15 mL/min/1.73 square meters)  Note: GFR calculation is accurate only with a steady state creatinine    Lipase [[id number]]  (Abnormal) Collected: 01/29/24 1557    Lab Status: Final result Specimen: Blood from Arm, Right Updated: 01/29/24 1623     Lipase 294 u/L     CBC and differential [[id number]] Collected: 01/29/24 1557    Lab Status: Final result Specimen: Blood from Arm, Right Updated: 01/29/24 1605     WBC 8.08 Thousand/uL      RBC 5.31 Million/uL      Hemoglobin 17.0 g/dL      Hematocrit 49.1 %      MCV 93 fL      MCH 32.0 pg      MCHC 34.6 g/dL      RDW 12.7 %      MPV 10.0 fL      Platelets 270 Thousands/uL      nRBC 0 /100 WBCs      Neutrophils Relative 58 %      Immat GRANS % 0 %      Lymphocytes Relative 34 %      Monocytes Relative 7 %      Eosinophils Relative 0 %      Basophils Relative 1 %      Neutrophils Absolute 4.71 Thousands/µL      Immature Grans Absolute 0.02 Thousand/uL      Lymphocytes Absolute 2.73 Thousands/µL      Monocytes Absolute 0.55 Thousand/µL      Eosinophils Absolute 0.03 Thousand/µL      Basophils Absolute 0.04 Thousands/µL                    CT Abdomen pelvis with contrast   Final Result by Cash Mary DO (01/29 1739)   Cholelithiasis without CT evidence of cholecystitis.   Stable 2 mm nonobstructing intrarenal calculus in left kidney.  No hydronephrosis.   No CT explanation for epigastric pain.      Possible mild portal hypertension.               Workstation performed: [id number]         US right upper quadrant   Final Result by Gregor Lanza DO (01/29 1654)      1. Cholelithiasis without sonographic evidence of acute cholecystitis.      Workstation performed: [id number]               Procedures  Procedures      ED Course                             SBIRT 20yo+      Flowsheet Row Most Recent  Value   Initial Alcohol Screen: US AUDIT-C     1. How often do you have a drink containing alcohol? 1 Filed at: 01/29/2024 1459   2. How many drinks containing alcohol do you have on a typical day you are drinking?  0 Filed at: 01/29/2024 1459   3a. Male UNDER 65: How often do you have five or more drinks on one occasion? 0 Filed at: 01/29/2024 1459   Audit-C Score 1 Filed at: 01/29/2024 1459   MEERA: How many times in the past year have you...    Used an illegal drug or used a prescription medication for non-medical reasons? Never Filed at: 01/29/2024 1459                  Medical Decision Making  Patient is 38-year-old male with PMH of cholelithiasis who presents to the ED with abdominal pain.    Vital signs stable. On exam no acute abnormalities, mild periumbilical and epigastric tenderness to palpation.    History and physical exam most consistent with GERD. However, differential diagnosis included but not limited to gastritis, pancreatitis, cholelithiasis, cholecystitis. Plan abdominal labs, CT, analgesia.  Will begin with Maalox and Pepcid, if that improves patient's symptoms, more likely to be GERD/gastritis    View ED course above for further discussion on patient workup.    All labs reviewed and utilized in the medical decision making process  All radiology studies independently viewed by me and interpreted by the radiologist.  I reviewed all testing with the patient.     Upon re-evaluation patient's pain improved with Maalox and Pepcid.  However, patient's lipase is elevated, will reach out to surgery for evaluation.    Patient tolerated p.o. in the emergency department, surgery deemed stable for discharge, recommended changes to diet to change to a low-fat diet.  Gave follow-up instructions to follow-up with surgery to have his gallbladder taken out.  Gave instructions to patient on how to obtain health insurance.  All questions answered prior to discharge, return precautions discussed.      Amount and/or  Complexity of Data Reviewed  Labs: ordered.  Radiology: ordered.    Risk  OTC drugs.  Prescription drug management.          Disposition  Final diagnoses:   Abdominal pain   Cholelithiasis     Time reflects when diagnosis was documented in both MDM as applicable and the Disposition within this note       Time User Action Codes Description Comment    1/29/2024  5:53 PM Eran Arias Add [R10.9] Abdominal pain     1/29/2024  7:39 PM Eran Arias Add [K80.20] Cholelithiasis           ED Disposition       ED Disposition   Discharge    Condition   Stable    Date/Time   Mon Jan 29, 2024 1801    Comment   Zane Eddie discharge to home/self care.                   Follow-up Information       Follow up With Specialties Details Why Contact Info Additional Information    [facility] Schedule an appointment as soon as possible for a visit As needed [address] [facility] [address]  [facility] [address] [phone] [facility] [place], [address] [address], [place], [place], [address] [phone]    [facility] Schedule an appointment as soon as possible for a visit   [address] [facility] [address]  [facility] [address] [phone] [facility] [facility], [place], [address]            Discharge Medication List as of 1/29/2024  7:40 PM        CONTINUE these medications which have NOT CHANGED    Details   !! famotidine (PEPCID) 20 mg tablet Take 1 tablet (20 mg total) by mouth 2 (two) times a day, Starting Sun 6/6/2021, Print      diphenhydrAMINE (BENADRYL) 25 mg tablet Take 2 tablets (50 mg total) by mouth every 8 (eight) hours as needed for itching, Starting Fri 6/4/2021, Normal      EPINEPHrine (EPIPEN) 0.3 mg/0.3 mL SOAJ Inject 0.3 mL (0.3 mg total) into a muscle once for 1 dose, Starting Sun 6/6/2021, Print      !! famotidine  (PEPCID) 20 mg tablet Take 1 tablet (20 mg total) by mouth 2 (two) times a day, Starting Tue 5/31/2022, Normal      hydrocortisone 1 % cream Apply to affected area 3 times daily as needed, Normal      hydrOXYzine HCL (ATARAX) 25 mg tablet Take 1 tablet (25 mg total) by mouth every 6 (six) hours as needed for itching for up to 20 doses, Starting Sun 6/6/2021, Print      ondansetron (ZOFRAN-ODT) 4 mg disintegrating tablet Take 1 tablet (4 mg total) by mouth every 6 (six) hours as needed for nausea or vomiting, Starting Tue 5/31/2022, Normal       !! - Potential duplicate medications found. Please discuss with provider.            PDMP Review       None             ED Provider  Attending physically available and evaluated Zane Morgan. I managed the patient along with the ED Attending.    Electronically Signed by           Eran Arias MD  01/29/24 2013

## 2024-01-29 NOTE — ED ATTENDING ATTESTATION
1/29/2024  I, Sudeep Marcano MD, saw and evaluated the patient. I have discussed the patient with the resident/non-physician practitioner and agree with the resident's/non-physician practitioner's findings, Plan of Care, and MDM as documented in the resident's/non-physician practitioner's note, except where noted. All available labs and Radiology studies were reviewed.  I was present for key portions of any procedure(s) performed by the resident/non-physician practitioner and I was immediately available to provide assistance.       At this point I agree with the current assessment done in the Emergency Department.  I have conducted an independent evaluation of this patient a history and physical is as follows:  Patient is a 38-year-old male.  He has a long history of gallstones.  He has had intermittent abdominal pain.  It usually resolves spontaneously.  However the last several days he has had continuous periumbilical pain associated with nausea.  No vomiting.  Worse with eating.  No associated fever or chills.  Physical exam: Patient has benign abdominal exam.  No umbilical hernia.  Negative Reynolds sign.  No tenderness at McBurney's point.  No peritoneal signs.  Assessment/plan: Abdominal pain workup.  Rule out biliary colic/acute cholecystitis, pancreatitis, peptic ulcer disease, early appendicitis, and other causes of abdominal pain.  ED Course         Critical Care Time  Procedures

## 2024-01-29 NOTE — DISCHARGE INSTRUCTIONS
Please read the attached packets of pancreatitis and a low-fat diet, which will both help your symptoms.  Take Pepcid every day.  Please follow-up with the surgery clinic.    To get coverage, go to the following website:  [email or web address].gov/get-[email or web address]

## 2024-01-29 NOTE — CONSULTS
"Consultation - General Surgery   Zane Morgan 38 y.o. male MRN: [id number]  Unit/Bed#: ED-40 Encounter: [id number]    Assessment/Plan     Assessment:  39 yo m w/ s/s suggestive of gallstone pancreatitis.      CTCAP: Cholelithiasis without CT evidence of cholecystitis.  Stable 2 mm nonobstructing intrarenal calculus in left kidney.  No hydronephrosis. No CT explanation for epigastric pain. Pancreas unremarkable.    AVSS on RA    WBC: 8  Hgb 17  Cr 0.91  G    AST: 23  ALT: 36    Lipase: 294    Plan:  -Pt does not want to be admitted 2/ lack of insurance  -gave strict return precautions, such as increasing abd pain pt expressed understanding using stateback  -recommending low fat diet  -recommend abd pain log  -may follow up as needed    History of Present Illness       HPI:  Zane Morgan is a 38 y.o. male w/ hx of GERD and cholelithiasis who presents with 4-5 months of intermittent 1-2 days of periumbilical pain, most notably after \"bad meals.\" Pt eats a high fat diet. He notes nausea but no emesis. Reports bloating. Denies anorexia. Pain radiates retrosternally. Occasional belching w/ sour, bitter taste. Denies regular Etoh use, 20 yr pack smoking hx. Denies regular nsaid use, no hx of new meds. No other relevant pmhx.     Pt reports he is currently uninsured and therefore would like to get insurance before any admission or follow up. He does not want to pay out of pocket for his hospitalization, therefore he declines admission. He understands the risk associated with this. We discussed return precautions including progressive and worsening abdominal pain, fever, nausea and bloating. A low fat diet was recommended and information provided in discharge paperwork. He was also instructed to follow up outpatient.       Consult to surgery general  Consult performed by: Wiliam Cast MD  Consult ordered by: Sudeep Marcano MD        Review of Systems   Constitutional:  Negative for chills, diaphoresis, " fatigue and fever.   HENT:  Positive for sinus pressure and sinus pain. Negative for rhinorrhea.    Eyes: Negative.  Negative for visual disturbance.   Respiratory:  Positive for cough. Negative for chest tightness and shortness of breath.    Cardiovascular:  Negative for chest pain and palpitations.   Gastrointestinal:  Positive for abdominal distention, abdominal pain and nausea. Negative for blood in stool, constipation, diarrhea and vomiting.   Endocrine: Negative.    Genitourinary: Negative.    Musculoskeletal: Negative.    Skin: Negative.    Allergic/Immunologic: Negative.    Neurological: Negative.    Hematological: Negative.    Psychiatric/Behavioral: Negative.         Historical Information   Past Medical History:   Diagnosis Date    No known health problems      Past Surgical History:   Procedure Laterality Date    TONSILLECTOMY       Social History   Social History     Substance and Sexual Activity   Alcohol Use Not Currently    Comment: Rarely     Social History     Substance and Sexual Activity   Drug Use Yes    Types: Marijuana    Comment: daily     E-Cigarette/Vaping    E-Cigarette Use Never User      E-Cigarette/Vaping Substances     Social History     Tobacco Use   Smoking Status Every Day    Current packs/day: 0.50    Types: Cigarettes   Smokeless Tobacco Never     Family History: History reviewed. No pertinent family history.    Meds/Allergies   PTA meds:   Prior to Admission Medications   Prescriptions Last Dose Informant Patient Reported? Taking?   EPINEPHrine (EPIPEN) 0.3 mg/0.3 mL SOAJ   No No   Sig: Inject 0.3 mL (0.3 mg total) into a muscle once for 1 dose   diphenhydrAMINE (BENADRYL) 25 mg tablet Not Taking  No No   Sig: Take 2 tablets (50 mg total) by mouth every 8 (eight) hours as needed for itching   Patient not taking: Reported on 1/29/2024   famotidine (PEPCID) 20 mg tablet   No Yes   Sig: Take 1 tablet (20 mg total) by mouth 2 (two) times a day   Patient taking differently: Take 20 mg  by mouth 2 (two) times a day Takes PRN   famotidine (PEPCID) 20 mg tablet   No No   Sig: Take 1 tablet (20 mg total) by mouth 2 (two) times a day   hydrOXYzine HCL (ATARAX) 25 mg tablet Not Taking  No No   Sig: Take 1 tablet (25 mg total) by mouth every 6 (six) hours as needed for itching for up to 20 doses   Patient not taking: Reported on 1/29/2024   hydrocortisone 1 % cream Not Taking  No No   Sig: Apply to affected area 3 times daily as needed   Patient not taking: Reported on 1/29/2024   ondansetron (ZOFRAN-ODT) 4 mg disintegrating tablet Not Taking  No No   Sig: Take 1 tablet (4 mg total) by mouth every 6 (six) hours as needed for nausea or vomiting   Patient not taking: Reported on 1/29/2024      Facility-Administered Medications: None     Allergies   Allergen Reactions    Penicillins        Objective   First Vitals:   Blood Pressure: 146/75 (01/29/24 1457)  Pulse: 99 (01/29/24 1457)  Temperature: 98.3 °F (36.8 °C) (01/29/24 1500)  Temp Source: Oral (01/29/24 1500)  Respirations: 18 (01/29/24 1457)  Height: 6' (182.9 cm) (01/29/24 1457)  Weight - Scale: 98.7 kg (217 lb 9.5 oz) (01/29/24 1457)  SpO2: 96 % (01/29/24 1457)    Current Vitals:   Blood Pressure: 110/65 (01/29/24 1713)  Pulse: 83 (01/29/24 1713)  Temperature: 98.3 °F (36.8 °C) (01/29/24 1500)  Temp Source: Oral (01/29/24 1500)  Respirations: 16 (01/29/24 1713)  Height: 6' (182.9 cm) (01/29/24 1457)  Weight - Scale: 98.7 kg (217 lb 9.5 oz) (01/29/24 1457)  SpO2: 97 % (01/29/24 1713)      Intake/Output Summary (Last 24 hours) at 1/29/2024 1810  Last data filed at 1/29/2024 1713  Gross per 24 hour   Intake 500 ml   Output --   Net 500 ml       Invasive Devices       Peripheral Intravenous Line  Duration             Peripheral IV 01/29/24 Distal;Right;Upper;Ventral (anterior) Arm <1 day                    Physical Exam  Constitutional:       General: He is not in acute distress.     Appearance: Normal appearance. He is obese. He is not ill-appearing or  "toxic-appearing.   HENT:      Head: Atraumatic.      Right Ear: External ear normal.      Left Ear: External ear normal.      Nose: Nose normal.      Mouth/Throat:      Mouth: Mucous membranes are moist.      Pharynx: Oropharynx is clear.   Eyes:      Extraocular Movements: Extraocular movements intact.      Pupils: Pupils are equal, round, and reactive to light.   Abdominal:      General: There is no distension.      Palpations: Abdomen is soft.      Comments: Mild periumbilical tenderness, no fascial defect, no hernia, no overlying skin changes, negative murphys sign, negative mcburneys, negative psoas sign, negative signs for peritonitis   Musculoskeletal:         General: Normal range of motion.   Skin:     General: Skin is warm.   Neurological:      General: No focal deficit present.      Mental Status: He is alert and oriented to person, place, and time.         Lab Results: I have personally reviewed pertinent lab results.  , CBC:   Lab Results   Component Value Date    WBC 8.08 01/29/2024    HGB 17.0 01/29/2024    HCT 49.1 01/29/2024    MCV 93 01/29/2024     01/29/2024    RBC 5.31 01/29/2024    MCH 32.0 01/29/2024    MCHC 34.6 01/29/2024    RDW 12.7 01/29/2024    MPV 10.0 01/29/2024    NRBC 0 01/29/2024   , CMP:   Lab Results   Component Value Date    SODIUM 137 01/29/2024    K 3.8 01/29/2024     01/29/2024    CO2 26 01/29/2024    BUN 12 01/29/2024    CREATININE 0.91 01/29/2024    CALCIUM 9.4 01/29/2024    AST 23 01/29/2024    ALT 36 01/29/2024    ALKPHOS 79 01/29/2024    EGFR 107 01/29/2024   , Urinalysis: No results found for: \"COLORU\", \"CLARITYU\", \"SPECGRAV\", \"PHUR\", \"LEUKOCYTESUR\", \"NITRITE\", \"PROTEINUA\", \"GLUCOSEU\", \"KETONESU\", \"BILIRUBINUR\", \"BLOODU\", Amylase: No results found for: \"AMYLASE\", Lipase:   Lab Results   Component Value Date    LIPASE 294 (H) 01/29/2024     Imaging: I have personally reviewed pertinent reports.   and I have personally reviewed pertinent films in PACS  EKG, " Pathology, and Other Studies: I have personally reviewed pertinent reports.   and I have personally reviewed pertinent films in PACS    Counseling / Coordination of Care  Total floor / unit time spent today 25 minutes.  Greater than 50% of total time was spent with the patient and / or family counseling and / or coordination of care.

## 2024-01-29 NOTE — Clinical Note
Zane Morgan was seen and treated in our emergency department on 1/29/2024.                Diagnosis:     Zane  may return to work on return date.    He may return on this date: 01/31/2024         If you have any questions or concerns, please don't hesitate to call.      Eran Arias MD    ______________________________           _______________          _______________  Hospital Representative                              Date                                Time

## 2024-01-30 NOTE — DISCHARGE INSTR - AVS FIRST PAGE
Please follow-up as scheduled. Please call our office when you leave to schedule follow-up.    Diet:    - We recommend a low fat diet, attached are clinical references to guide your diet.    Additional Instructions:  -please log your abdominal pain in a notebook and record information such as dates, duration and diet  - please return if you have symptoms such as fever, chills, nausea, vomiting sharp abdominal pain, persistent bloating or fullness   -If you have any questions or concerns after discharge please do not hesitate to contact our office, we would be happy to provide clarification      Call our office or return to the Emergency Room if you develop a fever greater than 101F, chills, persistent nausea/vomiting, worsening/uncontrollable pain, and/or increasing redness or purulent/foul smelling drainage from your incision(s)

## 2024-06-10 ENCOUNTER — APPOINTMENT (EMERGENCY)
Dept: ULTRASOUND IMAGING | Facility: HOSPITAL | Age: 39
End: 2024-06-10

## 2024-06-10 ENCOUNTER — APPOINTMENT (EMERGENCY)
Dept: RADIOLOGY | Facility: HOSPITAL | Age: 39
End: 2024-06-10

## 2024-06-10 ENCOUNTER — HOSPITAL ENCOUNTER (EMERGENCY)
Facility: HOSPITAL | Age: 39
Discharge: HOME | End: 2024-06-10
Attending: EMERGENCY MEDICINE | Admitting: EMERGENCY MEDICINE

## 2024-06-10 ENCOUNTER — APPOINTMENT (EMERGENCY)
Dept: CT IMAGING | Facility: HOSPITAL | Age: 39
End: 2024-06-10

## 2024-06-10 VITALS
OXYGEN SATURATION: 98 % | SYSTOLIC BLOOD PRESSURE: 125 MMHG | WEIGHT: 223.99 LBS | BODY MASS INDEX: 30.38 KG/M2 | TEMPERATURE: 98.2 F | RESPIRATION RATE: 17 BRPM | HEART RATE: 63 BPM | DIASTOLIC BLOOD PRESSURE: 76 MMHG

## 2024-06-10 DIAGNOSIS — N20.0 RENAL CALCULUS, LEFT: ICD-10-CM

## 2024-06-10 DIAGNOSIS — R10.9 ABDOMINAL PAIN: Primary | ICD-10-CM

## 2024-06-10 DIAGNOSIS — K80.20 CHOLELITHIASIS: ICD-10-CM

## 2024-06-10 LAB
ALBUMIN SERPL BCP-MCNC: 4.4 G/DL (ref 3.5–5)
ALP SERPL-CCNC: 84 U/L (ref 34–104)
ALT SERPL W P-5'-P-CCNC: 30 U/L (ref 7–52)
ANION GAP SERPL CALCULATED.3IONS-SCNC: 5 MMOL/L (ref 4–13)
AST SERPL W P-5'-P-CCNC: 17 U/L (ref 13–39)
ATRIAL RATE: 76 BPM
BASOPHILS # BLD AUTO: 0.02 THOUSANDS/ÂΜL (ref 0–0.1)
BASOPHILS NFR BLD AUTO: 0 % (ref 0–1)
BILIRUB DIRECT SERPL-MCNC: 0.1 MG/DL (ref 0–0.2)
BILIRUB SERPL-MCNC: 0.43 MG/DL (ref 0.2–1)
BUN SERPL-MCNC: 12 MG/DL (ref 5–25)
CALCIUM SERPL-MCNC: 10 MG/DL (ref 8.4–10.2)
CARDIAC TROPONIN I PNL SERPL HS: 4 NG/L
CHLORIDE SERPL-SCNC: 103 MMOL/L (ref 96–108)
CO2 SERPL-SCNC: 28 MMOL/L (ref 21–32)
CREAT SERPL-MCNC: 0.96 MG/DL (ref 0.6–1.3)
EOSINOPHIL # BLD AUTO: 0.07 THOUSAND/ÂΜL (ref 0–0.61)
EOSINOPHIL NFR BLD AUTO: 1 % (ref 0–6)
ERYTHROCYTE [DISTWIDTH] IN BLOOD BY AUTOMATED COUNT: 12.7 % (ref 11.6–15.1)
GFR SERPL CREATININE-BSD FRML MDRD: 99 ML/MIN/1.73SQ M
GLUCOSE SERPL-MCNC: 97 MG/DL (ref 65–140)
HCT VFR BLD AUTO: 48.1 % (ref 36.5–49.3)
HGB BLD-MCNC: 16.5 G/DL (ref 12–17)
IMM GRANULOCYTES # BLD AUTO: 0.01 THOUSAND/UL (ref 0–0.2)
IMM GRANULOCYTES NFR BLD AUTO: 0 % (ref 0–2)
LIPASE SERPL-CCNC: 79 U/L (ref 11–82)
LYMPHOCYTES # BLD AUTO: 2.38 THOUSANDS/ÂΜL (ref 0.6–4.47)
LYMPHOCYTES NFR BLD AUTO: 31 % (ref 14–44)
MCH RBC QN AUTO: 32.6 PG (ref 26.8–34.3)
MCHC RBC AUTO-ENTMCNC: 34.3 G/DL (ref 31.4–37.4)
MCV RBC AUTO: 95 FL (ref 82–98)
MONOCYTES # BLD AUTO: 0.48 THOUSAND/ÂΜL (ref 0.17–1.22)
MONOCYTES NFR BLD AUTO: 6 % (ref 4–12)
NEUTROPHILS # BLD AUTO: 4.71 THOUSANDS/ÂΜL (ref 1.85–7.62)
NEUTS SEG NFR BLD AUTO: 62 % (ref 43–75)
NRBC BLD AUTO-RTO: 0 /100 WBCS
P AXIS: 66 DEGREES
PLATELET # BLD AUTO: 238 THOUSANDS/UL (ref 149–390)
PMV BLD AUTO: 11 FL (ref 8.9–12.7)
POTASSIUM SERPL-SCNC: 3.8 MMOL/L (ref 3.5–5.3)
PR INTERVAL: 144 MS
PROT SERPL-MCNC: 7.7 G/DL (ref 6.4–8.4)
QRS AXIS: 90 DEGREES
QRSD INTERVAL: 90 MS
QT INTERVAL: 362 MS
QTC INTERVAL: 407 MS
RBC # BLD AUTO: 5.06 MILLION/UL (ref 3.88–5.62)
SODIUM SERPL-SCNC: 136 MMOL/L (ref 135–147)
T WAVE AXIS: 20 DEGREES
VENTRICULAR RATE: 76 BPM
WBC # BLD AUTO: 7.67 THOUSAND/UL (ref 4.31–10.16)

## 2024-06-10 PROCEDURE — 99284 EMERGENCY DEPT VISIT MOD MDM: CPT

## 2024-06-10 PROCEDURE — 83690 ASSAY OF LIPASE: CPT

## 2024-06-10 PROCEDURE — 36415 COLL VENOUS BLD VENIPUNCTURE: CPT

## 2024-06-10 PROCEDURE — 74177 CT ABD & PELVIS W/CONTRAST: CPT

## 2024-06-10 PROCEDURE — 84484 ASSAY OF TROPONIN QUANT: CPT

## 2024-06-10 PROCEDURE — 96374 THER/PROPH/DIAG INJ IV PUSH: CPT

## 2024-06-10 PROCEDURE — 80076 HEPATIC FUNCTION PANEL: CPT

## 2024-06-10 PROCEDURE — 96375 TX/PRO/DX INJ NEW DRUG ADDON: CPT

## 2024-06-10 PROCEDURE — 96361 HYDRATE IV INFUSION ADD-ON: CPT

## 2024-06-10 PROCEDURE — 71046 X-RAY EXAM CHEST 2 VIEWS: CPT

## 2024-06-10 PROCEDURE — 99285 EMERGENCY DEPT VISIT HI MDM: CPT

## 2024-06-10 PROCEDURE — 93010 ELECTROCARDIOGRAM REPORT: CPT

## 2024-06-10 PROCEDURE — 85025 COMPLETE CBC W/AUTO DIFF WBC: CPT

## 2024-06-10 PROCEDURE — 80048 BASIC METABOLIC PNL TOTAL CA: CPT

## 2024-06-10 PROCEDURE — 76705 ECHO EXAM OF ABDOMEN: CPT

## 2024-06-10 PROCEDURE — 93005 ELECTROCARDIOGRAM TRACING: CPT

## 2024-06-10 RX ORDER — KETOROLAC TROMETHAMINE 30 MG/ML
15 INJECTION, SOLUTION INTRAMUSCULAR; INTRAVENOUS ONCE
Status: COMPLETED | OUTPATIENT
Start: 2024-06-10 | End: 2024-06-10

## 2024-06-10 RX ORDER — ONDANSETRON 2 MG/ML
4 INJECTION INTRAMUSCULAR; INTRAVENOUS ONCE
Status: COMPLETED | OUTPATIENT
Start: 2024-06-10 | End: 2024-06-10

## 2024-06-10 RX ORDER — MAGNESIUM HYDROXIDE/ALUMINUM HYDROXICE/SIMETHICONE 120; 1200; 1200 MG/30ML; MG/30ML; MG/30ML
30 SUSPENSION ORAL ONCE
Status: COMPLETED | OUTPATIENT
Start: 2024-06-10 | End: 2024-06-10

## 2024-06-10 RX ADMIN — KETOROLAC TROMETHAMINE 15 MG: 30 INJECTION, SOLUTION INTRAMUSCULAR; INTRAVENOUS at 13:55

## 2024-06-10 RX ADMIN — SODIUM CHLORIDE 1000 ML: 0.9 INJECTION, SOLUTION INTRAVENOUS at 13:51

## 2024-06-10 RX ADMIN — IOHEXOL 100 ML: 350 INJECTION, SOLUTION INTRAVENOUS at 15:11

## 2024-06-10 RX ADMIN — ALUMINUM HYDROXIDE, MAGNESIUM HYDROXIDE, DIMETHICONE 30 ML: 400; 400; 40 SUSPENSION ORAL at 13:50

## 2024-06-10 RX ADMIN — ONDANSETRON 4 MG: 2 INJECTION INTRAMUSCULAR; INTRAVENOUS at 13:53

## 2024-06-10 NOTE — ED PROVIDER NOTES
History  Chief Complaint   Patient presents with    Epigastric Pain     Pt reports epigastric pain that has worsened with time; took two TUMS and daily pepcid with no relief.      This is a 39-year-old male who presents to the ED for evaluation of epigastric abdominal pain.  Patient reports he has had mild pain in the same area though states it did worsen 2 days prior reports upper abdominal pain, primarily in the epigastrium that radiates into his chest that he describes as a burning sensation.  Does report some mild nausea though states has been able to tolerate p.o. intake without difficulty.  States nothing in particular makes it better or worse.  He was seen in this ED for similar issue approximately 6 months ago, he was advised to start taking Pepcid.  He reports that he takes Pepcid almost daily, states it had been helping with the pain until last few days.  He has not followed up with a primary care provider or GI specialist since his last ED encounter as he states he does not have insurance.  He denies any recent fevers, headaches, lightheadedness, SOB, vomiting, diarrhea, blood in stool, dysuria.        Prior to Admission Medications   Prescriptions Last Dose Informant Patient Reported? Taking?   EPINEPHrine (EPIPEN) 0.3 mg/0.3 mL SOAJ   No No   Sig: Inject 0.3 mL (0.3 mg total) into a muscle once for 1 dose   diphenhydrAMINE (BENADRYL) 25 mg tablet   No No   Sig: Take 2 tablets (50 mg total) by mouth every 8 (eight) hours as needed for itching   Patient not taking: Reported on 1/29/2024   famotidine (PEPCID) 20 mg tablet   No No   Sig: Take 1 tablet (20 mg total) by mouth 2 (two) times a day   Patient taking differently: Take 20 mg by mouth 2 (two) times a day Takes PRN   famotidine (PEPCID) 20 mg tablet   No No   Sig: Take 1 tablet (20 mg total) by mouth 2 (two) times a day   hydrOXYzine HCL (ATARAX) 25 mg tablet   No No   Sig: Take 1 tablet (25 mg total) by mouth every 6 (six) hours as needed for  itching for up to 20 doses   Patient not taking: Reported on 1/29/2024   hydrocortisone 1 % cream   No No   Sig: Apply to affected area 3 times daily as needed   Patient not taking: Reported on 1/29/2024   ondansetron (ZOFRAN-ODT) 4 mg disintegrating tablet   No No   Sig: Take 1 tablet (4 mg total) by mouth every 6 (six) hours as needed for nausea or vomiting   Patient not taking: Reported on 1/29/2024      Facility-Administered Medications: None       Past Medical History:   Diagnosis Date    No known health problems        Past Surgical History:   Procedure Laterality Date    TONSILLECTOMY         History reviewed. No pertinent family history.  I have reviewed and agree with the history as documented.    E-Cigarette/Vaping    E-Cigarette Use Never User      E-Cigarette/Vaping Substances     Social History     Tobacco Use    Smoking status: Every Day     Current packs/day: 0.50     Types: Cigarettes    Smokeless tobacco: Never   Vaping Use    Vaping status: Never Used   Substance Use Topics    Alcohol use: Not Currently     Comment: Rarely    Drug use: Yes     Types: Marijuana     Comment: daily       Review of Systems   Constitutional:  Negative for chills and fever.   HENT: Negative.     Eyes:  Negative for photophobia and visual disturbance.   Respiratory:  Negative for cough and shortness of breath.    Cardiovascular:  Positive for chest pain (abdominal pain radiates into chest.  Burning sensation).   Gastrointestinal:  Positive for nausea. Negative for abdominal pain, diarrhea and vomiting.   Genitourinary:  Negative for difficulty urinating, dysuria, flank pain and hematuria.   Musculoskeletal:  Negative for neck pain and neck stiffness.   Neurological:  Negative for dizziness, syncope, light-headedness and headaches.       Physical Exam  Physical Exam  Vitals and nursing note reviewed.   Constitutional:       General: He is not in acute distress.     Appearance: He is well-developed.   HENT:      Head:  Normocephalic and atraumatic.   Eyes:      Conjunctiva/sclera: Conjunctivae normal.   Cardiovascular:      Rate and Rhythm: Normal rate and regular rhythm.      Heart sounds: No murmur heard.  Pulmonary:      Effort: Pulmonary effort is normal. No respiratory distress.      Breath sounds: Normal breath sounds.   Abdominal:      General: There is no distension.      Palpations: Abdomen is soft.      Tenderness: There is abdominal tenderness in the epigastric area and left upper quadrant. There is no right CVA tenderness, left CVA tenderness or guarding. Negative signs include Reynolds's sign.   Musculoskeletal:         General: No swelling.      Cervical back: Normal range of motion and neck supple.   Skin:     General: Skin is warm and dry.      Capillary Refill: Capillary refill takes less than 2 seconds.   Neurological:      General: No focal deficit present.      Mental Status: He is alert and oriented to person, place, and time.   Psychiatric:         Mood and Affect: Mood normal.         Vital Signs  ED Triage Vitals   Temperature Pulse Respirations Blood Pressure SpO2   06/10/24 1253 06/10/24 1253 06/10/24 1253 06/10/24 1253 06/10/24 1253   98.2 °F (36.8 °C) 97 18 143/84 98 %      Temp Source Heart Rate Source Patient Position - Orthostatic VS BP Location FiO2 (%)   06/10/24 1253 06/10/24 1253 06/10/24 1253 06/10/24 1253 --   Oral Monitor Sitting Right arm       Pain Score       06/10/24 1355       7           Vitals:    06/10/24 1253 06/10/24 1500 06/10/24 1630 06/10/24 1815   BP: 143/84 117/67 110/68 125/76   Pulse: 97 70 60 63   Patient Position - Orthostatic VS: Sitting Lying Lying Lying         Visual Acuity      ED Medications  Medications   sodium chloride 0.9 % bolus 1,000 mL (0 mL Intravenous Stopped 6/10/24 1453)   ondansetron (ZOFRAN) injection 4 mg (4 mg Intravenous Given 6/10/24 1353)   ketorolac (TORADOL) injection 15 mg (15 mg Intravenous Given 6/10/24 1355)   aluminum-magnesium  hydroxide-simethicone (MAALOX) oral suspension 30 mL (30 mL Oral Given 6/10/24 1350)   iohexol (OMNIPAQUE) 350 MG/ML injection (SINGLE-DOSE) 100 mL (100 mL Intravenous Given 6/10/24 1511)       Diagnostic Studies  Results Reviewed       Procedure Component Value Units Date/Time    HS Troponin 0hr (reflex protocol) [[id number]]  (Normal) Collected: 06/10/24 1349    Lab Status: Final result Specimen: Blood from Arm, Right Updated: 06/10/24 1419     hs TnI 0hr 4 ng/L     Basic metabolic panel [[id number]] Collected: 06/10/24 1349    Lab Status: Final result Specimen: Blood from Arm, Right Updated: 06/10/24 1416     Sodium 136 mmol/L      Potassium 3.8 mmol/L      Chloride 103 mmol/L      CO2 28 mmol/L      ANION GAP 5 mmol/L      BUN 12 mg/dL      Creatinine 0.96 mg/dL      Glucose 97 mg/dL      Calcium 10.0 mg/dL      eGFR 99 ml/min/1.73sq m     Narrative:      National Kidney Disease Foundation guidelines for Chronic Kidney Disease (CKD):     Stage 1 with normal or high GFR (GFR > 90 mL/min/1.73 square meters)    Stage 2 Mild CKD (GFR = 60-89 mL/min/1.73 square meters)    Stage 3A Moderate CKD (GFR = 45-59 mL/min/1.73 square meters)    Stage 3B Moderate CKD (GFR = 30-44 mL/min/1.73 square meters)    Stage 4 Severe CKD (GFR = 15-29 mL/min/1.73 square meters)    Stage 5 End Stage CKD (GFR <15 mL/min/1.73 square meters)  Note: GFR calculation is accurate only with a steady state creatinine    Hepatic function panel [[id number]]  (Normal) Collected: 06/10/24 1349    Lab Status: Final result Specimen: Blood from Arm, Right Updated: 06/10/24 1416     Total Bilirubin 0.43 mg/dL      Bilirubin, Direct 0.10 mg/dL      Alkaline Phosphatase 84 U/L      AST 17 U/L      ALT 30 U/L      Total Protein 7.7 g/dL      Albumin 4.4 g/dL     Lipase [[id number]]  (Normal) Collected: 06/10/24 1349    Lab Status: Final result Specimen: Blood from Arm, Right Updated: 06/10/24 1416     Lipase 79 u/L     CBC and differential [[id number]] Collected:  06/10/24 1349    Lab Status: Final result Specimen: Blood from Arm, Right Updated: 06/10/24 1407     WBC 7.67 Thousand/uL      RBC 5.06 Million/uL      Hemoglobin 16.5 g/dL      Hematocrit 48.1 %      MCV 95 fL      MCH 32.6 pg      MCHC 34.3 g/dL      RDW 12.7 %      MPV 11.0 fL      Platelets 238 Thousands/uL      nRBC 0 /100 WBCs      Segmented % 62 %      Immature Grans % 0 %      Lymphocytes % 31 %      Monocytes % 6 %      Eosinophils Relative 1 %      Basophils Relative 0 %      Absolute Neutrophils 4.71 Thousands/µL      Absolute Immature Grans 0.01 Thousand/uL      Absolute Lymphocytes 2.38 Thousands/µL      Absolute Monocytes 0.48 Thousand/µL      Eosinophils Absolute 0.07 Thousand/µL      Basophils Absolute 0.02 Thousands/µL                    US right upper quadrant   Final Result by Eddie Nava MD (06/10 7658)      Cholelithiasis without acute cholecystitis      Workstation performed: [id number]         CT abdomen pelvis with contrast   Final Result by Rei Ge MD (06/10 1602)      No acute findings in the abdomen or pelvis.      Punctate nonobstructing left renal calculus.      Cholelithiasis.         Workstation performed: [id number]         XR chest 2 views   ED Interpretation by Bharath Mckeon PA-C (06/10 1345)   ED interpretation: No acute cardiopulmonary disease.      Final Result by Rhiannon Nunez MD (06/10 4576)      No acute cardiopulmonary disease.            Workstation performed: [id number]                    Procedures  ECG 12 Lead Documentation Only    Date/Time: 6/10/2024 2:42 PM    Performed by: Bharath Mckeon PA-C  Authorized by: Bharath Mckeon PA-C    Indications / Diagnosis:  Abdominal pain  Rate:     ECG rate:  76    ECG rate assessment: normal    Rhythm:     Rhythm: sinus rhythm    Ectopy:     Ectopy: none    QRS:     QRS axis:  Normal  Conduction:     Conduction: normal    ST segments:     ST segments:  Normal  T waves:     T waves: normal             ED Course                                              Medical Decision Making  31-year-old male presents to the ED for evaluation of abdominal pain radiating into his chest.  Reports this has been intermittent for months close states it is worse over the last 2 days.  Some nausea, no vomiting, no difficulty tolerating p.o. intake at home.  Plan is for labs, CT abdomen pelvis, Toradol, Maalox, Protonix for pain.  Patient does report pain rating into his chest will get EKG and troponin.    EKG acutely unremarkable, troponin negative.  Labs appear acutely unremarkable.     Amount and/or Complexity of Data Reviewed  Labs: ordered.  Radiology: ordered and independent interpretation performed.    Risk  OTC drugs.  Prescription drug management.             Disposition  Final diagnoses:   Abdominal pain   Cholelithiasis   Renal calculus, left     Time reflects when diagnosis was documented in both MDM as applicable and the Disposition within this note       Time User Action Codes Description Comment    6/10/2024  3:31 PM Bharath Mckeon Add [R10.9] Abdominal pain     6/10/2024  6:19 PM Elena Metcalf Add [K80.20] Cholelithiasis     6/10/2024  6:20 PM Elena Metcalf Add [N20.0] Renal calculus, left           ED Disposition       ED Disposition   Discharge    Condition   Stable    Date/Time   Mon Antonio 10, 2024  6:19 PM    Comment   Zane Morgan discharge to home/self care.                   Follow-up Information       Follow up With Specialties Details Why Contact Info Additional Information    [facility] Schedule an appointment as soon as possible for a visit  For re-check [address]  [facility] [place] [address] [phone]  [facility] [place], [facility] [address], [place], [place], [address] [phone]    [facility] Schedule an appointment as soon as possible for a visit   For re-check [address], [address]  [facility] [address] [phone] [facility] [place], [address], [address], [place], [place], [address] [phone]    [facility] Schedule an appointment as soon as possible for a visit  As needed to discuss gallstones [address] [address] [phone] [facility], [address] [facility], [facility] [address], [place], [place], [address] [phone]            Discharge Medication List as of 6/10/2024  6:20 PM        CONTINUE these medications which have NOT CHANGED    Details   diphenhydrAMINE (BENADRYL) 25 mg tablet Take 2 tablets (50 mg total) by mouth every 8 (eight) hours as needed for itching, Starting Fri 6/4/2021, Normal      EPINEPHrine (EPIPEN) 0.3 mg/0.3 mL SOAJ Inject 0.3 mL (0.3 mg total) into a muscle once for 1 dose, Starting Sun 6/6/2021, Print      !! famotidine (PEPCID) 20 mg tablet Take 1 tablet (20 mg total) by mouth 2 (two) times a day, Starting Sun 6/6/2021, Print      !! famotidine (PEPCID) 20 mg tablet Take 1 tablet (20 mg total) by mouth 2 (two) times a day, Starting Tue 5/31/2022, Normal      hydrocortisone 1 % cream Apply to affected area 3 times daily as needed, Normal      hydrOXYzine HCL (ATARAX) 25 mg tablet Take 1 tablet (25 mg total) by mouth every 6 (six) hours as needed for itching for up to 20 doses, Starting Sun 6/6/2021, Print      ondansetron (ZOFRAN-ODT) 4 mg disintegrating tablet Take 1 tablet (4 mg total) by mouth every 6 (six) hours as needed for nausea or vomiting, Starting Tue 5/31/2022, Normal       !! - Potential duplicate medications found. Please discuss with provider.          No discharge procedures on file.    PDMP Review       None            ED Provider  Electronically Signed by             Bharath Mckeon PA-C  06/11/24 6517

## 2024-06-10 NOTE — ED CARE HANDOFF
Emergency Department Sign Out Note        Sign out and transfer of care from Bharath Mckeon PA-C. See Separate Emergency Department note.     The patient, Zane Morgan, was evaluated by the previous provider for epigastric/abdominal pain and nausea.    Workup Completed:  Labs, EKG, cxr, CT abd/pelvis  IVF and medications    ED Course / Workup Pending (followup):  CT scan results and final disposition.     CT abd/pelvis-   No acute findings in the abdomen or pelvis.     Punctate nonobstructing left renal calculus.     Cholelithiasis    Discussed all results with patient including incidental findings. Patient aware of cholelithiasis. States he was told previously to follow up outpatient however he does not currently have insurance so hasn't seen general surgery. He has gotten paperwork from [employer] for insurance. He admits to having a poor diet. States feeling better currently after medications.   Will check RUQ US given cholelithiasis and worsening pain the past few days.     RUQ US- Cholelithiasis without acute cholecystitis     Discussed these results with patient.   Patient still doing well and without pain. Vitals stable.     The management plan was discussed in detail with the patient at bedside and all questions were answered.  Prior to discharge, we provided both verbal and written instructions.  We discussed with the patient the signs and symptoms for which to return to the emergency department.  All questions were answered and patient was comfortable with the plan of care and discharged to home.  Instructed the patient to follow up with the primary care provider and/or specialist provided and their written instructions.  The patient verbalized understanding of our discussion and plan of care, and agrees to return to the Emergency Department for concerns and progression of illness.     At discharge, I instructed the patient to:  -follow up with pcp  -follow up with the recommended specialists- GI and general  surgery   -return to the ER if symptoms worsened or new symptoms arose  Patient agreed to this plan and was stable at time of discharge.                                 ED Course as of 06/10/24 1826   Mon Antonio 10, 2024   1557 Received sign out from ARIADNA MORELOS, patient with gradually worsening epigastric pain. Labs stable. Awaiting CT scan results and final disposition. Patient stable at time of sign out.      Procedures  Medical Decision Making  Amount and/or Complexity of Data Reviewed  Labs: ordered.  Radiology: ordered and independent interpretation performed.    Risk  OTC drugs.  Prescription drug management.            Disposition  Final diagnoses:   Abdominal pain   Cholelithiasis   Renal calculus, left     Time reflects when diagnosis was documented in both MDM as applicable and the Disposition within this note       Time User Action Codes Description Comment    6/10/2024  3:31 PM Bharath Mckeon Add [R10.9] Abdominal pain     6/10/2024  6:19 PM Elena Metcalf Add [K80.20] Cholelithiasis     6/10/2024  6:20 PM Elena Metcalf Add [N20.0] Renal calculus, left           ED Disposition       ED Disposition   Discharge    Condition   Stable    Date/Time   Mon Antonio 10, 2024  6:19 PM    Comment   Zane Morgan discharge to home/self care.                   Follow-up Information       Follow up With Specialties Details Why Contact Info Additional Information    [facility] [place] [facility] Schedule an appointment as soon as possible for a visit  For re-check [address]  [facility] [address]  [facility] [place] [address] [phone] [facility] [place], [facility] [address], [place], [place], [address] [phone]    [facility] Schedule an appointment as soon as possible for a visit  For re-check [address] [place] [address], [address]  [facility] [place] [address] [phone] [facility] [place], [address] [facility], [address], [place], [place], [address] [phone]    [facility] Schedule an appointment as soon as possible for a visit  As needed to discuss gallstones [address] [address] [phone] [facility], [address] [address], [place], [place], [address] [phone]          Patient's Medications   Discharge Prescriptions    No medications on file     No discharge procedures on file.       ED Provider  Electronically Signed by     Elena Metcalf PA-C  06/10/24 9552

## 2024-06-10 NOTE — Clinical Note
Zane Morgan was seen and treated in our emergency department on 6/10/2024.                Diagnosis:     Zane  .    He may return on this date: 06/12/2024         If you have any questions or concerns, please don't hesitate to call.      Bharath Mckeon PA-C    ______________________________           _______________          _______________  Hospital Representative                              Date                                Time

## 2024-10-29 ENCOUNTER — APPOINTMENT (EMERGENCY)
Dept: CT IMAGING | Facility: HOSPITAL | Age: 39
End: 2024-10-29

## 2024-10-29 ENCOUNTER — HOSPITAL ENCOUNTER (EMERGENCY)
Facility: HOSPITAL | Age: 39
Discharge: HOME | End: 2024-10-29
Attending: EMERGENCY MEDICINE | Admitting: EMERGENCY MEDICINE

## 2024-10-29 VITALS
TEMPERATURE: 97.3 F | SYSTOLIC BLOOD PRESSURE: 152 MMHG | WEIGHT: 225.97 LBS | RESPIRATION RATE: 18 BRPM | BODY MASS INDEX: 30.65 KG/M2 | OXYGEN SATURATION: 100 % | DIASTOLIC BLOOD PRESSURE: 90 MMHG | HEART RATE: 86 BPM

## 2024-10-29 DIAGNOSIS — R10.10 UPPER ABDOMINAL PAIN: Primary | ICD-10-CM

## 2024-10-29 DIAGNOSIS — K80.20 CHOLELITHIASIS: ICD-10-CM

## 2024-10-29 LAB
ALBUMIN SERPL BCG-MCNC: 4.3 G/DL (ref 3.5–5)
ALP SERPL-CCNC: 81 U/L (ref 34–104)
ALT SERPL W P-5'-P-CCNC: 24 U/L (ref 7–52)
ANION GAP SERPL CALCULATED.3IONS-SCNC: 7 MMOL/L (ref 4–13)
AST SERPL W P-5'-P-CCNC: 15 U/L (ref 13–39)
BASOPHILS # BLD AUTO: 0.03 THOUSANDS/ΜL (ref 0–0.1)
BASOPHILS NFR BLD AUTO: 0 % (ref 0–1)
BILIRUB SERPL-MCNC: 0.35 MG/DL (ref 0.2–1)
BUN SERPL-MCNC: 13 MG/DL (ref 5–25)
CALCIUM SERPL-MCNC: 9.3 MG/DL (ref 8.4–10.2)
CHLORIDE SERPL-SCNC: 101 MMOL/L (ref 96–108)
CO2 SERPL-SCNC: 27 MMOL/L (ref 21–32)
CREAT SERPL-MCNC: 1.02 MG/DL (ref 0.6–1.3)
EOSINOPHIL # BLD AUTO: 0.08 THOUSAND/ΜL (ref 0–0.61)
EOSINOPHIL NFR BLD AUTO: 1 % (ref 0–6)
ERYTHROCYTE [DISTWIDTH] IN BLOOD BY AUTOMATED COUNT: 12.4 % (ref 11.6–15.1)
GFR SERPL CREATININE-BSD FRML MDRD: 92 ML/MIN/1.73SQ M
GLUCOSE SERPL-MCNC: 118 MG/DL (ref 65–140)
HCT VFR BLD AUTO: 46 % (ref 36.5–49.3)
HGB BLD-MCNC: 15.9 G/DL (ref 12–17)
IMM GRANULOCYTES # BLD AUTO: 0.03 THOUSAND/UL (ref 0–0.2)
IMM GRANULOCYTES NFR BLD AUTO: 0 % (ref 0–2)
LIPASE SERPL-CCNC: 239 U/L (ref 11–82)
LYMPHOCYTES # BLD AUTO: 2.25 THOUSANDS/ΜL (ref 0.6–4.47)
LYMPHOCYTES NFR BLD AUTO: 19 % (ref 14–44)
MCH RBC QN AUTO: 32.1 PG (ref 26.8–34.3)
MCHC RBC AUTO-ENTMCNC: 34.6 G/DL (ref 31.4–37.4)
MCV RBC AUTO: 93 FL (ref 82–98)
MONOCYTES # BLD AUTO: 0.75 THOUSAND/ΜL (ref 0.17–1.22)
MONOCYTES NFR BLD AUTO: 6 % (ref 4–12)
NEUTROPHILS # BLD AUTO: 8.51 THOUSANDS/ΜL (ref 1.85–7.62)
NEUTS SEG NFR BLD AUTO: 74 % (ref 43–75)
NRBC BLD AUTO-RTO: 0 /100 WBCS
PLATELET # BLD AUTO: 237 THOUSANDS/UL (ref 149–390)
PMV BLD AUTO: 10.4 FL (ref 8.9–12.7)
POTASSIUM SERPL-SCNC: 3.9 MMOL/L (ref 3.5–5.3)
PROT SERPL-MCNC: 7.2 G/DL (ref 6.4–8.4)
RBC # BLD AUTO: 4.96 MILLION/UL (ref 3.88–5.62)
SODIUM SERPL-SCNC: 135 MMOL/L (ref 135–147)
WBC # BLD AUTO: 11.65 THOUSAND/UL (ref 4.31–10.16)

## 2024-10-29 PROCEDURE — 36415 COLL VENOUS BLD VENIPUNCTURE: CPT

## 2024-10-29 PROCEDURE — 96361 HYDRATE IV INFUSION ADD-ON: CPT

## 2024-10-29 PROCEDURE — 74177 CT ABD & PELVIS W/CONTRAST: CPT

## 2024-10-29 PROCEDURE — 85025 COMPLETE CBC W/AUTO DIFF WBC: CPT

## 2024-10-29 PROCEDURE — 96375 TX/PRO/DX INJ NEW DRUG ADDON: CPT

## 2024-10-29 PROCEDURE — 83690 ASSAY OF LIPASE: CPT

## 2024-10-29 PROCEDURE — 80053 COMPREHEN METABOLIC PANEL: CPT

## 2024-10-29 PROCEDURE — 99285 EMERGENCY DEPT VISIT HI MDM: CPT

## 2024-10-29 PROCEDURE — 99284 EMERGENCY DEPT VISIT MOD MDM: CPT

## 2024-10-29 PROCEDURE — 96374 THER/PROPH/DIAG INJ IV PUSH: CPT

## 2024-10-29 RX ORDER — ONDANSETRON 2 MG/ML
4 INJECTION INTRAMUSCULAR; INTRAVENOUS ONCE
Status: COMPLETED | OUTPATIENT
Start: 2024-10-29 | End: 2024-10-29

## 2024-10-29 RX ORDER — KETOROLAC TROMETHAMINE 30 MG/ML
30 INJECTION, SOLUTION INTRAMUSCULAR; INTRAVENOUS ONCE
Status: COMPLETED | OUTPATIENT
Start: 2024-10-29 | End: 2024-10-29

## 2024-10-29 RX ADMIN — KETOROLAC TROMETHAMINE 30 MG: 30 INJECTION, SOLUTION INTRAMUSCULAR; INTRAVENOUS at 03:38

## 2024-10-29 RX ADMIN — ONDANSETRON 4 MG: 2 INJECTION INTRAMUSCULAR; INTRAVENOUS at 03:38

## 2024-10-29 RX ADMIN — SODIUM CHLORIDE 1000 ML: 0.9 INJECTION, SOLUTION INTRAVENOUS at 03:42

## 2024-10-29 RX ADMIN — IOHEXOL 100 ML: 350 INJECTION, SOLUTION INTRAVENOUS at 04:45

## 2024-10-29 NOTE — DISCHARGE INSTRUCTIONS
Although your lipase was elevated this level is not consistent with acute pancreatitis.  Your CT scan did not show any acute findings that would explain your symptoms.  Symptoms may have been due to something such as gastritis, gastroenteritis, esophageal spasm, etc.  They could also be from symptomatic cholelithiasis AKA gallstones.  Recommend that you follow-up with general surgery to discuss further management such as elective cholecystectomy.

## 2024-10-29 NOTE — ED PROVIDER NOTES
Time reflects when diagnosis was documented in both MDM as applicable and the Disposition within this note       Time User Action Codes Description Comment    10/29/2024  5:35 AM Joel Pino [R10.10] Upper abdominal pain     10/29/2024  5:35 AM Joel Pino [K80.20] Cholelithiasis           ED Disposition       ED Disposition   Discharge    Condition   Stable    Date/Time   Tue Oct 29, 2024  5:35 AM    Comment   Zane Eddie discharge to home/self care.                   Assessment & Plan       Medical Decision Making  39-year-old male presents with epigastric pain and nausea.  Exam: Patient appears uncomfortable but nontoxic; tender palpation of epigastrium, rest of abdomen soft and nontender; no tenderness to palpation of sternum.  Workup: CBC, CMP, lipase, CT abdomen pelvis with contrast.  Management: IV fluids, Toradol, Zofran.    Patient feeling moderate improvement following medications, sleeping on reevaluation.  Labs did show an elevated lipase but not 3 times normal and not consistent with acute pancreatitis.  He has had elevations in the past to similar extent.  Mild leukocytosis, nonspecific.  No acute findings on CT scan.  Did redemonstrate calcified gallstones.  Discussed all results with patient.  Suggested that he follow-up with general surgery to discuss further management, potentially elective cholecystectomy at some point.  Discussed other supportive care including medications like Tylenol and Motrin.  Can also follow-up with PCP for further care.  Return to ED if developing acutely worsening symptoms.  Patient expresses understanding of the condition, treatment plan, follow-up instructions, and return precautions.      Amount and/or Complexity of Data Reviewed  Labs: ordered. Decision-making details documented in ED Course.  Radiology: ordered.    Risk  Prescription drug management.        ED Course as of 10/29/24 0615   Tue Oct 29, 2024   0348 WBC(!): 11.65   0427 LIPASE(!): 239        Medications   sodium chloride 0.9 % bolus 1,000 mL (0 mL Intravenous Stopped 10/29/24 0603)   ketorolac (TORADOL) injection 30 mg (30 mg Intravenous Given 10/29/24 0338)   ondansetron (ZOFRAN) injection 4 mg (4 mg Intravenous Given 10/29/24 0338)   iohexol (OMNIPAQUE) 350 MG/ML injection (MULTI-DOSE) 100 mL (100 mL Intravenous Given 10/29/24 9635)       ED Risk Strat Scores                                               History of Present Illness       Chief Complaint   Patient presents with    Abdominal Pain     Pt BIBA from home where he reports pain began at midnight in the center of his abdomen. Took tums and pepcid with no relief pta. Reports hx of gallsrones and symptoms feel similar. +Nausea, -v/d       Past Medical History:   Diagnosis Date    No known health problems       Past Surgical History:   Procedure Laterality Date    TONSILLECTOMY        History reviewed. No pertinent family history.   Social History     Tobacco Use    Smoking status: Every Day     Current packs/day: 0.50     Types: Cigarettes    Smokeless tobacco: Never   Vaping Use    Vaping status: Never Used   Substance Use Topics    Alcohol use: Not Currently     Comment: Rarely    Drug use: Yes     Types: Marijuana     Comment: daily      E-Cigarette/Vaping    E-Cigarette Use Never User       E-Cigarette/Vaping Substances      I have reviewed and agree with the history as documented.     39-year-old male with PMH of cholelithiasis and nephrolithiasis presents for evaluation of epigastric pain.  This began in the evening, states he was unable to get to sleep due to the pain.  Has had similar episodes before for which she was seen in the ER however this time pain is purely in the epigastrium.  Admits to associated nausea.  Denies chest pain, SOB, fevers.        Review of Systems   Constitutional:  Negative for chills and fever.   HENT:  Negative for ear pain and sore throat.    Eyes:  Negative for pain and visual disturbance.    Respiratory:  Negative for cough and shortness of breath.    Cardiovascular:  Negative for chest pain and palpitations.   Gastrointestinal:  Positive for abdominal pain and nausea. Negative for vomiting.   Genitourinary:  Negative for dysuria and hematuria.   Musculoskeletal:  Negative for arthralgias and back pain.   Skin:  Negative for color change and rash.   Neurological:  Negative for seizures and syncope.   All other systems reviewed and are negative.          Objective       ED Triage Vitals [10/29/24 0319]   Temperature Pulse Blood Pressure Respirations SpO2 Patient Position - Orthostatic VS   (!) 97.3 °F (36.3 °C) 86 152/90 18 100 % Lying      Temp Source Heart Rate Source BP Location FiO2 (%) Pain Score    Oral Monitor Right arm -- (S) 10 - Worst Possible Pain      Vitals      Date and Time Temp Pulse SpO2 Resp BP Pain Score FACES Pain Rating User   10/29/24 0319 97.3 °F (36.3 °C) 86 100 % 18 152/90  10 - Worst Possible Pain -- DF            Physical Exam  Vitals and nursing note reviewed.   Constitutional:       General: He is in acute distress.      Appearance: He is well-developed.   HENT:      Head: Normocephalic and atraumatic.   Eyes:      Conjunctiva/sclera: Conjunctivae normal.   Cardiovascular:      Rate and Rhythm: Normal rate and regular rhythm.      Heart sounds: No murmur heard.  Pulmonary:      Effort: Pulmonary effort is normal. No respiratory distress.      Breath sounds: Normal breath sounds.   Abdominal:      Palpations: Abdomen is soft.      Tenderness: There is abdominal tenderness in the epigastric area.   Musculoskeletal:         General: No swelling.      Cervical back: Neck supple.   Skin:     General: Skin is warm and dry.      Capillary Refill: Capillary refill takes less than 2 seconds.   Neurological:      Mental Status: He is alert.   Psychiatric:         Mood and Affect: Mood normal.         Results Reviewed       Procedure Component Value Units Date/Time    Comprehensive  metabolic panel [[id number]] Collected: 10/29/24 0338    Lab Status: Final result Specimen: Blood from Arm, Right Updated: 10/29/24 0404     Sodium 135 mmol/L      Potassium 3.9 mmol/L      Chloride 101 mmol/L      CO2 27 mmol/L      ANION GAP 7 mmol/L      BUN 13 mg/dL      Creatinine 1.02 mg/dL      Glucose 118 mg/dL      Calcium 9.3 mg/dL      AST 15 U/L      ALT 24 U/L      Alkaline Phosphatase 81 U/L      Total Protein 7.2 g/dL      Albumin 4.3 g/dL      Total Bilirubin 0.35 mg/dL      eGFR 92 ml/min/1.73sq m     Narrative:      [employer] guidelines for Chronic Kidney Disease (CKD):     Stage 1 with normal or high GFR (GFR > 90 mL/min/1.73 square meters)    Stage 2 Mild CKD (GFR = 60-89 mL/min/1.73 square meters)    Stage 3A Moderate CKD (GFR = 45-59 mL/min/1.73 square meters)    Stage 3B Moderate CKD (GFR = 30-44 mL/min/1.73 square meters)    Stage 4 Severe CKD (GFR = 15-29 mL/min/1.73 square meters)    Stage 5 End Stage CKD (GFR <15 mL/min/1.73 square meters)  Note: GFR calculation is accurate only with a steady state creatinine    Lipase [[id number]]  (Abnormal) Collected: 10/29/24 0338    Lab Status: Final result Specimen: Blood from Arm, Right Updated: 10/29/24 0404     Lipase 239 u/L     CBC and differential [[id number]]  (Abnormal) Collected: 10/29/24 0338    Lab Status: Final result Specimen: Blood from Arm, Right Updated: 10/29/24 0348     WBC 11.65 Thousand/uL      RBC 4.96 Million/uL      Hemoglobin 15.9 g/dL      Hematocrit 46.0 %      MCV 93 fL      MCH 32.1 pg      MCHC 34.6 g/dL      RDW 12.4 %      MPV 10.4 fL      Platelets 237 Thousands/uL      nRBC 0 /100 WBCs      Segmented % 74 %      Immature Grans % 0 %      Lymphocytes % 19 %      Monocytes % 6 %      Eosinophils Relative 1 %      Basophils Relative 0 %      Absolute Neutrophils 8.51 Thousands/µL      Absolute Immature Grans 0.03 Thousand/uL      Absolute Lymphocytes 2.25 Thousands/µL      Absolute Monocytes 0.75  Thousand/µL      Eosinophils Absolute 0.08 Thousand/µL      Basophils Absolute 0.03 Thousands/µL             CT abdomen pelvis with contrast   Final Interpretation by Wing Montes De Oca DO (10/29 0458)      No CT evidence of acute findings.         Workstation performed: [id number]             Procedures    ED Medication and Procedure Management   Prior to Admission Medications   Prescriptions Last Dose Informant Patient Reported? Taking?   EPINEPHrine (EPIPEN) 0.3 mg/0.3 mL SOAJ   No No   Sig: Inject 0.3 mL (0.3 mg total) into a muscle once for 1 dose   diphenhydrAMINE (BENADRYL) 25 mg tablet   No No   Sig: Take 2 tablets (50 mg total) by mouth every 8 (eight) hours as needed for itching   Patient not taking: Reported on 1/29/2024   famotidine (PEPCID) 20 mg tablet   No No   Sig: Take 1 tablet (20 mg total) by mouth 2 (two) times a day   Patient taking differently: Take 20 mg by mouth 2 (two) times a day Takes PRN   famotidine (PEPCID) 20 mg tablet   No No   Sig: Take 1 tablet (20 mg total) by mouth 2 (two) times a day   hydrOXYzine HCL (ATARAX) 25 mg tablet   No No   Sig: Take 1 tablet (25 mg total) by mouth every 6 (six) hours as needed for itching for up to 20 doses   Patient not taking: Reported on 1/29/2024   hydrocortisone 1 % cream   No No   Sig: Apply to affected area 3 times daily as needed   Patient not taking: Reported on 1/29/2024   ondansetron (ZOFRAN-ODT) 4 mg disintegrating tablet   No No   Sig: Take 1 tablet (4 mg total) by mouth every 6 (six) hours as needed for nausea or vomiting   Patient not taking: Reported on 1/29/2024      Facility-Administered Medications: None     Discharge Medication List as of 10/29/2024  5:37 AM        CONTINUE these medications which have NOT CHANGED    Details   diphenhydrAMINE (BENADRYL) 25 mg tablet Take 2 tablets (50 mg total) by mouth every 8 (eight) hours as needed for itching, Starting Fri 6/4/2021, Normal      EPINEPHrine (EPIPEN) 0.3 mg/0.3 mL SOAJ Inject 0.3 mL  (0.3 mg total) into a muscle once for 1 dose, Starting Sun 6/6/2021, Print      !! famotidine (PEPCID) 20 mg tablet Take 1 tablet (20 mg total) by mouth 2 (two) times a day, Starting Sun 6/6/2021, Print      !! famotidine (PEPCID) 20 mg tablet Take 1 tablet (20 mg total) by mouth 2 (two) times a day, Starting Tue 5/31/2022, Normal      hydrocortisone 1 % cream Apply to affected area 3 times daily as needed, Normal      hydrOXYzine HCL (ATARAX) 25 mg tablet Take 1 tablet (25 mg total) by mouth every 6 (six) hours as needed for itching for up to 20 doses, Starting Sun 6/6/2021, Print      ondansetron (ZOFRAN-ODT) 4 mg disintegrating tablet Take 1 tablet (4 mg total) by mouth every 6 (six) hours as needed for nausea or vomiting, Starting Tue 5/31/2022, Normal       !! - Potential duplicate medications found. Please discuss with provider.          ED SEPSIS DOCUMENTATION   Time reflects when diagnosis was documented in both MDM as applicable and the Disposition within this note       Time User Action Codes Description Comment    10/29/2024  5:35 AM Joel Pino [R10.10] Upper abdominal pain     10/29/2024  5:35 AM Joel Pino [K80.20] Cholelithiasis                  Joel Pino PA-C  10/29/24 0616

## 2024-10-29 NOTE — Clinical Note
Zane Morgan was seen and treated in our emergency department on 10/29/2024.                Diagnosis: Epigastric pain and nausea    Zane  .    He may return on this date: 10/30/2024         If you have any questions or concerns, please don't hesitate to call.      Joel Pino PA-C    ______________________________           _______________          _______________  Hospital Representative                              Date                                Time

## 2025-07-09 ENCOUNTER — HOSPITAL ENCOUNTER (EMERGENCY)
Facility: HOSPITAL | Age: 40
Discharge: HOME | End: 2025-07-09
Attending: EMERGENCY MEDICINE

## 2025-07-09 VITALS
OXYGEN SATURATION: 97 % | DIASTOLIC BLOOD PRESSURE: 77 MMHG | HEART RATE: 114 BPM | WEIGHT: 216.49 LBS | BODY MASS INDEX: 29.36 KG/M2 | TEMPERATURE: 98.3 F | SYSTOLIC BLOOD PRESSURE: 134 MMHG | RESPIRATION RATE: 18 BRPM

## 2025-07-09 DIAGNOSIS — L23.7 POISON IVY DERMATITIS: Primary | ICD-10-CM

## 2025-07-09 PROCEDURE — 99282 EMERGENCY DEPT VISIT SF MDM: CPT

## 2025-07-09 PROCEDURE — 99284 EMERGENCY DEPT VISIT MOD MDM: CPT

## 2025-07-09 RX ORDER — PREDNISONE 10 MG/1
TABLET ORAL
Qty: 47 TABLET | Refills: 0 | Status: SHIPPED | OUTPATIENT
Start: 2025-07-09 | End: 2025-07-27

## 2025-07-09 NOTE — Clinical Note
Zane Morgan was seen and treated in our emergency department on 7/9/2025.                Diagnosis:     Zane  may return to work on return date.    He may return on this date: 07/11/2025         If you have any questions or concerns, please don't hesitate to call.      Ana Laura Jerry PA-C    ______________________________           _______________          _______________  Hospital Representative                              Date                                Time

## 2025-07-09 NOTE — ED PROVIDER NOTES
Time reflects when diagnosis was documented in both MDM as applicable and the Disposition within this note       Time User Action Codes Description Comment    7/9/2025 11:23 AM Ana Laura Jerry Add [L23.7] Poison ivy dermatitis           ED Disposition       ED Disposition   Discharge    Condition   Stable    Date/Time   Wed Jul 9, 2025 11:23 AM    Comment   Zane Monteroer discharge to home/self care.                   Assessment & Plan       Medical Decision Making  Will treat with steroid taper. No signs of cellulitis to warrant abx at this time.     I have discussed the plan to discharge pt from ED. The patient was discharged in stable condition.  Patient ambulated off the department.  Extensive return to emergency department precautions were discussed.  Follow up with appropriate providers including primary care physician was discussed.  Patient and/or their  primary decision maker expressed understanding.  Patient remained stable during entire emergency department stay.      Risk  Prescription drug management.             Medications - No data to display    ED Risk Strat Scores                    No data recorded        SBIRT 22yo+      Flowsheet Row Most Recent Value   Initial Alcohol Screen: US AUDIT-C     1. How often do you have a drink containing alcohol? 0 Filed at: 07/09/2025 1128   2. How many drinks containing alcohol do you have on a typical day you are drinking?  0 Filed at: 07/09/2025 1128   3a. Male UNDER 65: How often do you have five or more drinks on one occasion? 0 Filed at: 07/09/2025 1128   3b. FEMALE Any Age, or MALE 65+: How often do you have 4 or more drinks on one occassion? 0 Filed at: 07/09/2025 1128   Audit-C Score 0 Filed at: 07/09/2025 1128   MEERA: How many times in the past year have you...    Used an illegal drug or used a prescription medication for non-medical reasons? Never Filed at: 07/09/2025 1128                            History of Present Illness       Chief Complaint    Patient presents with    Poison Ivy     Pt presents with poison ivy starting Monday, with worsening to R arm, tried calamine & alcohol to area with no relief.        Past Medical History[1]   Past Surgical History[2]   Family History[3]   Social History[4]   E-Cigarette/Vaping    E-Cigarette Use Never User       E-Cigarette/Vaping Substances      I have reviewed and agree with the history as documented.     40 YOM presents today with poison ivy on his right arm that started on Monday. Reports it is draining and painful. Denies fevers. Has been using calamine lotion and washing it with Lye soap without relief.         Review of Systems   Constitutional:  Negative for chills and fever.   Skin:  Positive for rash.           Objective       ED Triage Vitals [07/09/25 1108]   Temperature Pulse Blood Pressure Respirations SpO2 Patient Position - Orthostatic VS   98.3 °F (36.8 °C) (!) 114 134/77 18 97 % Sitting      Temp Source Heart Rate Source BP Location FiO2 (%) Pain Score    Oral Monitor Left arm -- --      Vitals      Date and Time Temp Pulse SpO2 Resp BP Pain Score FACES Pain Rating User   07/09/25 1108 98.3 °F (36.8 °C) 114 97 % 18 134/77 -- -- NZ            Physical Exam  Vitals and nursing note reviewed.   Constitutional:       General: He is not in acute distress.     Appearance: Normal appearance. He is well-developed.   HENT:      Head: Normocephalic and atraumatic.     Eyes:      Conjunctiva/sclera: Conjunctivae normal.       Cardiovascular:      Rate and Rhythm: Normal rate.   Pulmonary:      Effort: Pulmonary effort is normal.     Musculoskeletal:         General: No swelling. Normal range of motion.      Cervical back: Normal range of motion and neck supple.     Skin:     General: Skin is warm and dry.      Findings: Rash present. Rash is macular, papular and vesicular.     Neurological:      Mental Status: He is alert.     Psychiatric:         Mood and Affect: Mood normal.         Behavior: Behavior  normal.         Results Reviewed       None            No orders to display       Procedures    ED Medication and Procedure Management   Prior to Admission Medications   Prescriptions Last Dose Informant Patient Reported? Taking?   EPINEPHrine (EPIPEN) 0.3 mg/0.3 mL SOAJ   No No   Sig: Inject 0.3 mL (0.3 mg total) into a muscle once for 1 dose      Facility-Administered Medications: None     Discharge Medication List as of 7/9/2025 11:24 AM        START taking these medications    Details   predniSONE 10 mg tablet Multiple Dosages:Starting Wed 7/9/2025, Until Fri 7/11/2025 at 2359, THEN Starting Sat 7/12/2025, Until Mon 7/14/2025 at 2359, THEN Starting Tue 7/15/2025, Until Thu 7/17/2025 at 2359, THEN Starting Fri 7/18/2025, Until Sun 7/20/2025 at 2359, THEN S tarting Mon 7/21/2025, Until Wed 7/23/2025 at 2359, THEN Starting Thu 7/24/2025, Until Sat 7/26/2025 at 2359Take 5 tablets (50 mg total) by mouth daily for 3 days, THEN 4 tablets (40 mg total) daily for 3 days, THEN 3 tablets (30 mg total) daily for 3  days, THEN 2 tablets (20 mg total) daily for 3 days, THEN 1 tablet (10 mg total) daily for 3 days, THEN 0.5 tablets (5 mg total) daily for 3 days., Normal           CONTINUE these medications which have NOT CHANGED    Details   EPINEPHrine (EPIPEN) 0.3 mg/0.3 mL SOAJ Inject 0.3 mL (0.3 mg total) into a muscle once for 1 dose, Starting Sun 6/6/2021, Print      diphenhydrAMINE (BENADRYL) 25 mg tablet Take 2 tablets (50 mg total) by mouth every 8 (eight) hours as needed for itching, Starting Fri 6/4/2021, Normal      !! famotidine (PEPCID) 20 mg tablet Take 1 tablet (20 mg total) by mouth 2 (two) times a day, Starting Sun 6/6/2021, Print      !! famotidine (PEPCID) 20 mg tablet Take 1 tablet (20 mg total) by mouth 2 (two) times a day, Starting Tue 5/31/2022, Normal      hydrocortisone 1 % cream Apply to affected area 3 times daily as needed, Normal      hydrOXYzine HCL (ATARAX) 25 mg tablet Take 1 tablet (25 mg  total) by mouth every 6 (six) hours as needed for itching for up to 20 doses, Starting Sun 6/6/2021, Print      ondansetron (ZOFRAN-ODT) 4 mg disintegrating tablet Take 1 tablet (4 mg total) by mouth every 6 (six) hours as needed for nausea or vomiting, Starting Tue 5/31/2022, Normal       !! - Potential duplicate medications found. Please discuss with provider.        No discharge procedures on file.  ED SEPSIS DOCUMENTATION   Time reflects when diagnosis was documented in both MDM as applicable and the Disposition within this note       Time User Action Codes Description Comment    7/9/2025 11:23 AM Ana Laura Jerry Add [L23.7] Poison ivy dermatitis                    [1]   Past Medical History:  Diagnosis Date    No known health problems    [2]   Past Surgical History:  Procedure Laterality Date    TONSILLECTOMY     [3] No family history on file.  [4]   Social History  Tobacco Use    Smoking status: Every Day     Current packs/day: 0.50     Types: Cigarettes    Smokeless tobacco: Never   Vaping Use    Vaping status: Never Used   Substance Use Topics    Alcohol use: Not Currently     Comment: Rarely    Drug use: Yes     Types: Marijuana     Comment: daily        Ana Laura Jerry PA-C  07/09/25 7041

## 2025-08-11 ENCOUNTER — HOSPITAL ENCOUNTER (EMERGENCY)
Facility: HOSPITAL | Age: 40
Discharge: HOME | End: 2025-08-11
Attending: EMERGENCY MEDICINE | Admitting: EMERGENCY MEDICINE